# Patient Record
Sex: MALE | Race: WHITE | NOT HISPANIC OR LATINO | Employment: OTHER | URBAN - METROPOLITAN AREA
[De-identification: names, ages, dates, MRNs, and addresses within clinical notes are randomized per-mention and may not be internally consistent; named-entity substitution may affect disease eponyms.]

---

## 2022-07-13 ENCOUNTER — APPOINTMENT (OUTPATIENT)
Dept: NEUROLOGY | Facility: HOSPITAL | Age: 72
End: 2022-07-13

## 2022-07-13 ENCOUNTER — HOSPITAL ENCOUNTER (INPATIENT)
Facility: HOSPITAL | Age: 72
LOS: 1 days | Discharge: HOME | End: 2022-07-14
Attending: INTERNAL MEDICINE | Admitting: INTERNAL MEDICINE

## 2022-07-13 ENCOUNTER — APPOINTMENT (OUTPATIENT)
Dept: MRI IMAGING | Facility: HOSPITAL | Age: 72
End: 2022-07-13

## 2022-07-13 ENCOUNTER — APPOINTMENT (OUTPATIENT)
Dept: CARDIOLOGY | Facility: HOSPITAL | Age: 72
End: 2022-07-13

## 2022-07-13 ENCOUNTER — APPOINTMENT (OUTPATIENT)
Dept: CT IMAGING | Facility: HOSPITAL | Age: 72
End: 2022-07-13

## 2022-07-13 DIAGNOSIS — R13.10 DYSPHAGIA, UNSPECIFIED TYPE: ICD-10-CM

## 2022-07-13 DIAGNOSIS — R41.841 COGNITIVE COMMUNICATION DEFICIT: Primary | ICD-10-CM

## 2022-07-13 PROBLEM — I48.91 A-FIB: Status: ACTIVE | Noted: 2022-07-13

## 2022-07-13 PROBLEM — I63.9 STROKE: Status: ACTIVE | Noted: 2022-07-13

## 2022-07-13 PROBLEM — R56.9 SEIZURE: Status: ACTIVE | Noted: 2022-07-13

## 2022-07-13 LAB
B PARAPERT DNA SPEC QL NAA+PROBE: NOT DETECTED
B PERT DNA SPEC QL NAA+PROBE: NOT DETECTED
BH CV ECHO MEAS - AO MAX PG: 45 MMHG
BH CV ECHO MEAS - AO MEAN PG: 27 MMHG
BH CV ECHO MEAS - AO ROOT DIAM: 3.6 CM
BH CV ECHO MEAS - AO V2 MAX: 335 CM/SEC
BH CV ECHO MEAS - AO V2 VTI: 56.6 CM
BH CV ECHO MEAS - AVA(I,D): 1.3 CM2
BH CV ECHO MEAS - EDV(CUBED): 91.1 ML
BH CV ECHO MEAS - EDV(MOD-SP2): 126 ML
BH CV ECHO MEAS - EDV(MOD-SP4): 135 ML
BH CV ECHO MEAS - EF(MOD-BP): 61.2 %
BH CV ECHO MEAS - EF(MOD-SP2): 55.1 %
BH CV ECHO MEAS - EF(MOD-SP4): 63.3 %
BH CV ECHO MEAS - ESV(CUBED): 32.8 ML
BH CV ECHO MEAS - ESV(MOD-SP2): 56.6 ML
BH CV ECHO MEAS - ESV(MOD-SP4): 49.5 ML
BH CV ECHO MEAS - FS: 28.9 %
BH CV ECHO MEAS - IVS/LVPW: 1 CM
BH CV ECHO MEAS - IVSD: 1.1 CM
BH CV ECHO MEAS - LA DIMENSION: 4.9 CM
BH CV ECHO MEAS - LAT PEAK E' VEL: 7.7 CM/SEC
BH CV ECHO MEAS - LV MASS(C)D: 175 GRAMS
BH CV ECHO MEAS - LV MAX PG: 4.7 MMHG
BH CV ECHO MEAS - LV MEAN PG: 3 MMHG
BH CV ECHO MEAS - LV V1 MAX: 108 CM/SEC
BH CV ECHO MEAS - LV V1 VTI: 23.5 CM
BH CV ECHO MEAS - LVIDD: 4.5 CM
BH CV ECHO MEAS - LVIDS: 3.2 CM
BH CV ECHO MEAS - LVOT AREA: 3.1 CM2
BH CV ECHO MEAS - LVOT DIAM: 2 CM
BH CV ECHO MEAS - LVPWD: 1.1 CM
BH CV ECHO MEAS - MED PEAK E' VEL: 8.4 CM/SEC
BH CV ECHO MEAS - MV A MAX VEL: 99.7 CM/SEC
BH CV ECHO MEAS - MV DEC TIME: 0.27 MSEC
BH CV ECHO MEAS - MV E MAX VEL: 95.4 CM/SEC
BH CV ECHO MEAS - MV E/A: 0.96
BH CV ECHO MEAS - PA ACC TIME: 0.15 SEC
BH CV ECHO MEAS - PA PR(ACCEL): 12.4 MMHG
BH CV ECHO MEAS - PA V2 MAX: 119 CM/SEC
BH CV ECHO MEAS - RAP SYSTOLE: 8 MMHG
BH CV ECHO MEAS - RVSP: 26 MMHG
BH CV ECHO MEAS - SV(LVOT): 73.8 ML
BH CV ECHO MEAS - SV(MOD-SP2): 69.4 ML
BH CV ECHO MEAS - SV(MOD-SP4): 85.5 ML
BH CV ECHO MEAS - TAPSE (>1.6): 2.03 CM
BH CV ECHO MEAS - TR MAX PG: 17.7 MMHG
BH CV ECHO MEAS - TR MAX VEL: 206.5 CM/SEC
BH CV ECHO MEASUREMENTS AVERAGE E/E' RATIO: 11.85
BH CV VAS BP LEFT ARM: NORMAL MMHG
BH CV XLRA - RV BASE: 4.1 CM
BH CV XLRA - RV LENGTH: 5.8 CM
BH CV XLRA - RV MID: 2.9 CM
BH CV XLRA - TDI S': 6 CM/SEC
C PNEUM DNA NPH QL NAA+NON-PROBE: NOT DETECTED
CHOLEST SERPL-MCNC: 165 MG/DL (ref 0–200)
FLUAV SUBTYP SPEC NAA+PROBE: NOT DETECTED
FLUBV RNA ISLT QL NAA+PROBE: NOT DETECTED
GLUCOSE BLDC GLUCOMTR-MCNC: 111 MG/DL (ref 70–130)
HADV DNA SPEC NAA+PROBE: NOT DETECTED
HBA1C MFR BLD: 4.8 % (ref 4.8–5.6)
HCOV 229E RNA SPEC QL NAA+PROBE: NOT DETECTED
HCOV HKU1 RNA SPEC QL NAA+PROBE: NOT DETECTED
HCOV NL63 RNA SPEC QL NAA+PROBE: NOT DETECTED
HCOV OC43 RNA SPEC QL NAA+PROBE: NOT DETECTED
HDLC SERPL-MCNC: 54 MG/DL (ref 40–60)
HMPV RNA NPH QL NAA+NON-PROBE: NOT DETECTED
HPIV1 RNA ISLT QL NAA+PROBE: NOT DETECTED
HPIV2 RNA SPEC QL NAA+PROBE: NOT DETECTED
HPIV3 RNA NPH QL NAA+PROBE: NOT DETECTED
HPIV4 P GENE NPH QL NAA+PROBE: NOT DETECTED
LDLC SERPL CALC-MCNC: 98 MG/DL (ref 0–100)
LDLC/HDLC SERPL: 1.81 {RATIO}
LEFT ATRIUM VOLUME INDEX: 35.3 ML/M2
M PNEUMO IGG SER IA-ACNC: NOT DETECTED
MAXIMAL PREDICTED HEART RATE: 149 BPM
RHINOVIRUS RNA SPEC NAA+PROBE: NOT DETECTED
RSV RNA NPH QL NAA+NON-PROBE: NOT DETECTED
SARS-COV-2 RNA NPH QL NAA+NON-PROBE: NOT DETECTED
STRESS TARGET HR: 127 BPM
TRIGL SERPL-MCNC: 65 MG/DL (ref 0–150)
VLDLC SERPL-MCNC: 13 MG/DL (ref 5–40)

## 2022-07-13 PROCEDURE — 99223 1ST HOSP IP/OBS HIGH 75: CPT | Performed by: INTERNAL MEDICINE

## 2022-07-13 PROCEDURE — 92523 SPEECH SOUND LANG COMPREHEN: CPT

## 2022-07-13 PROCEDURE — 82962 GLUCOSE BLOOD TEST: CPT

## 2022-07-13 PROCEDURE — 83036 HEMOGLOBIN GLYCOSYLATED A1C: CPT

## 2022-07-13 PROCEDURE — 92610 EVALUATE SWALLOWING FUNCTION: CPT

## 2022-07-13 PROCEDURE — 70551 MRI BRAIN STEM W/O DYE: CPT

## 2022-07-13 PROCEDURE — 94660 CPAP INITIATION&MGMT: CPT

## 2022-07-13 PROCEDURE — 70450 CT HEAD/BRAIN W/O DYE: CPT

## 2022-07-13 PROCEDURE — 0 LEVETIRACETAM IN NACL 0.54% 1500 MG/100ML SOLUTION: Performed by: NURSE PRACTITIONER

## 2022-07-13 PROCEDURE — 95711 VEEG 2-12 HR UNMONITORED: CPT

## 2022-07-13 PROCEDURE — 0042T HC CT CEREBRAL PERFUSION W/WO CONTRAST: CPT

## 2022-07-13 PROCEDURE — 70498 CT ANGIOGRAPHY NECK: CPT

## 2022-07-13 PROCEDURE — 80061 LIPID PANEL: CPT

## 2022-07-13 PROCEDURE — 99223 1ST HOSP IP/OBS HIGH 75: CPT | Performed by: STUDENT IN AN ORGANIZED HEALTH CARE EDUCATION/TRAINING PROGRAM

## 2022-07-13 PROCEDURE — 93306 TTE W/DOPPLER COMPLETE: CPT

## 2022-07-13 PROCEDURE — 0202U NFCT DS 22 TRGT SARS-COV-2: CPT | Performed by: INTERNAL MEDICINE

## 2022-07-13 PROCEDURE — 0 IOPAMIDOL PER 1 ML: Performed by: INTERNAL MEDICINE

## 2022-07-13 PROCEDURE — 93306 TTE W/DOPPLER COMPLETE: CPT | Performed by: INTERNAL MEDICINE

## 2022-07-13 PROCEDURE — 70496 CT ANGIOGRAPHY HEAD: CPT

## 2022-07-13 PROCEDURE — 3E03317 INTRODUCTION OF OTHER THROMBOLYTIC INTO PERIPHERAL VEIN, PERCUTANEOUS APPROACH: ICD-10-PCS | Performed by: INTERNAL MEDICINE

## 2022-07-13 PROCEDURE — 94799 UNLISTED PULMONARY SVC/PX: CPT

## 2022-07-13 RX ORDER — MELOXICAM 15 MG/1
1 TABLET ORAL DAILY
COMMUNITY
End: 2022-10-18

## 2022-07-13 RX ORDER — ACETAMINOPHEN 325 MG/1
650 TABLET ORAL EVERY 4 HOURS PRN
Status: DISCONTINUED | OUTPATIENT
Start: 2022-07-13 | End: 2022-07-14 | Stop reason: HOSPADM

## 2022-07-13 RX ORDER — ACETAMINOPHEN 650 MG/1
650 SUPPOSITORY RECTAL EVERY 4 HOURS PRN
Status: DISCONTINUED | OUTPATIENT
Start: 2022-07-13 | End: 2022-07-14 | Stop reason: HOSPADM

## 2022-07-13 RX ORDER — SODIUM CHLORIDE 0.9 % (FLUSH) 0.9 %
10 SYRINGE (ML) INJECTION AS NEEDED
Status: DISCONTINUED | OUTPATIENT
Start: 2022-07-13 | End: 2022-07-14 | Stop reason: HOSPADM

## 2022-07-13 RX ORDER — ASPIRIN 300 MG/1
300 SUPPOSITORY RECTAL DAILY
Status: DISCONTINUED | OUTPATIENT
Start: 2022-07-14 | End: 2022-07-14 | Stop reason: HOSPADM

## 2022-07-13 RX ORDER — SODIUM CHLORIDE 0.9 % (FLUSH) 0.9 %
10 SYRINGE (ML) INJECTION EVERY 12 HOURS SCHEDULED
Status: DISCONTINUED | OUTPATIENT
Start: 2022-07-13 | End: 2022-07-13

## 2022-07-13 RX ORDER — SODIUM CHLORIDE 0.9 % (FLUSH) 0.9 %
10 SYRINGE (ML) INJECTION AS NEEDED
Status: DISCONTINUED | OUTPATIENT
Start: 2022-07-13 | End: 2022-07-13

## 2022-07-13 RX ORDER — FAMOTIDINE 20 MG/1
20 TABLET, FILM COATED ORAL
Status: DISCONTINUED | OUTPATIENT
Start: 2022-07-13 | End: 2022-07-14 | Stop reason: HOSPADM

## 2022-07-13 RX ORDER — ASPIRIN 325 MG
325 TABLET ORAL DAILY
Status: DISCONTINUED | OUTPATIENT
Start: 2022-07-14 | End: 2022-07-14 | Stop reason: HOSPADM

## 2022-07-13 RX ORDER — ATORVASTATIN CALCIUM 40 MG/1
80 TABLET, FILM COATED ORAL NIGHTLY
Status: DISCONTINUED | OUTPATIENT
Start: 2022-07-13 | End: 2022-07-14 | Stop reason: HOSPADM

## 2022-07-13 RX ORDER — FAMOTIDINE 10 MG/ML
20 INJECTION, SOLUTION INTRAVENOUS EVERY 12 HOURS SCHEDULED
Status: DISCONTINUED | OUTPATIENT
Start: 2022-07-13 | End: 2022-07-13

## 2022-07-13 RX ORDER — ONDANSETRON 2 MG/ML
4 INJECTION INTRAMUSCULAR; INTRAVENOUS EVERY 6 HOURS PRN
Status: DISCONTINUED | OUTPATIENT
Start: 2022-07-13 | End: 2022-07-14 | Stop reason: HOSPADM

## 2022-07-13 RX ORDER — LEVETIRACETAM 15 MG/ML
1500 INJECTION INTRAVASCULAR ONCE
Status: COMPLETED | OUTPATIENT
Start: 2022-07-13 | End: 2022-07-13

## 2022-07-13 RX ORDER — LEVETIRACETAM 5 MG/ML
500 INJECTION INTRAVASCULAR EVERY 12 HOURS SCHEDULED
Status: DISCONTINUED | OUTPATIENT
Start: 2022-07-13 | End: 2022-07-14 | Stop reason: HOSPADM

## 2022-07-13 RX ORDER — NAPROXEN SODIUM 220 MG/1
1 TABLET ORAL DAILY
COMMUNITY

## 2022-07-13 RX ORDER — SODIUM CHLORIDE 0.9 % (FLUSH) 0.9 %
10 SYRINGE (ML) INJECTION EVERY 12 HOURS SCHEDULED
Status: DISCONTINUED | OUTPATIENT
Start: 2022-07-13 | End: 2022-07-14 | Stop reason: HOSPADM

## 2022-07-13 RX ADMIN — FAMOTIDINE 20 MG: 10 INJECTION, SOLUTION INTRAVENOUS at 08:49

## 2022-07-13 RX ADMIN — ATORVASTATIN CALCIUM 80 MG: 40 TABLET, FILM COATED ORAL at 20:45

## 2022-07-13 RX ADMIN — IOPAMIDOL 115 ML: 755 INJECTION, SOLUTION INTRAVENOUS at 05:20

## 2022-07-13 RX ADMIN — FAMOTIDINE 20 MG: 20 TABLET ORAL at 18:33

## 2022-07-13 RX ADMIN — Medication 10 ML: at 20:46

## 2022-07-13 RX ADMIN — Medication 10 ML: at 08:49

## 2022-07-13 RX ADMIN — LEVETIRACETAM INJECTION 1500 MG: 15 INJECTION INTRAVENOUS at 10:39

## 2022-07-13 NOTE — CASE MANAGEMENT/SOCIAL WORK
Discharge Planning Assessment  [facility]     Patient Name: Priyank Small  MRN: [id number]  Today's Date: 7/13/2022    Admit Date: 7/13/2022     Discharge Needs Assessment     Row Name 07/13/22 1119       Living Environment    People in Home spouse    Name(s) of People in Home Wife Pilar Small    Current Living Arrangements home    Primary Care Provided by self    Provides Primary Care For no one    Family Caregiver if Needed spouse    Quality of Family Relationships helpful;involved;supportive    Able to Return to Prior Arrangements yes       Resource/Environmental Concerns    Resource/Environmental Concerns none       Transition Planning    Patient/Family Anticipates Transition to home with family    Patient/Family Anticipated Services at Transition     Transportation Anticipated family or friend will provide       Discharge Needs Assessment    Readmission Within the Last 30 Days no previous admission in last 30 days    Equipment Currently Used at Home cpap    Concerns to be Addressed discharge planning               Discharge Plan     Row Name 07/13/22 1138       Plan    Plan Ongoing    Plan Comments     I met with Mr. Small and his wife Pilar today at the bedside to intiate discharge planning assessment. Mr. Small lives in [facility] with his wife Pilar.     Prior to admission he was independent with adl's. He uses a cpap at night and is not current with any therapy.     Case management will continue to follow Mr. Small's progress and provide for him a safe discharge plan.        Final Discharge Disposition Code 30 - still a patient              Continued Care and Services - Admitted Since 7/13/2022    Coordination has not been started for this encounter.          Demographic Summary     Row Name 07/13/22 1118       General Information    General Information Comments Primary provider is Ailyn Light MD.   Payor source is [employer].   Fully vaccinated for covid with 1 booster.   Living  will is not on file.               Functional Status     Row Name 07/13/22 1119       Functional Status, IADL    Medications independent    Meal Preparation independent    Housekeeping independent    Laundry independent    Shopping independent       Mental Status    General Appearance WDL WDL       Mental Status Summary    Recent Changes in Mental Status/Cognitive Functioning no changes       Employment/    Employment Status retired           Anju Ledesma RN

## 2022-07-13 NOTE — PLAN OF CARE
Goal Outcome Evaluation:  Plan of Care Reviewed With: spouse, patient            SLP evaluation completed. Will continue to address dysphagia and cognitive-communication. Please see note for further details and recommendations.

## 2022-07-13 NOTE — CONSULTS
Stroke Consult Note    Patient Name: Priyank Small   MRN: [id number]  Age: 71 y.o.  Sex: male  : 1950    Primary Care Physician: Ailyn Light MD  Referring Physician:  Ary Jaramillo MD    TIME STROKE TEAM CALLED: 0440 EST     TIME PATIENT SEEN: 440 EST    Handedness: Unknown  Race:     Chief Complaint/Reason for Consultation: Aphasia, dysarthria, and confusion    HPI: Priyank Small is a 71 year old male with known medical history of hypertension and new onset atrial fibrillation with RVR who presented to [facility] after waking up shaking and confused around 0000 on 2022, appeared post ictal to the EMS crew. OSH reported that CT Head negative for acute intracranial abnormality ot hemorrhage. Per OSH report he was back to a normal neurologic baseline with no deficits after arrival to [place] and remained that way until 0300 when he had an acute onset of expressive aphasia. He was given an NIHSS of 5 for confusion, dysarthria, and aphasia. He was going to be admitted to [facility] for seizure workup until he had the acute onset of aphasia and dysarthria. He was considered to be a candidate for IV TPA d/t no contraindications and a return to baseline upon admission to the hospital with an abrupt neurologic change at 0300. He did not have any seizure activity noted at time of neurologic change while at [place]. Per OSH the risks and benefits of TPA were discussed with patient and spouse who consented, TPA administered at 0400 on 2022. OSH reported that BP was 121/91. Lorazepam and Levetiracetam ordered at OSH but not given.     Upon arrival to [facility] patient is met emergently in CT scan, NIHSS 2 for right leg drift and right facial droop. Aphasia has resolved. Patient is able to lift his right leg but reports that it feels much heavier than his left leg.     CT head repeated upon arrival d/t new deficit of right leg weakness and right facial droop negative for acute  intracranial abnormality, no hemorrhage.     Last Known Normal Date/Time: 0300 7/13/2022 EST     Review of Systems   Constitutional: Negative for fatigue and fever.   HENT: Negative for trouble swallowing.    Eyes: Negative for photophobia and visual disturbance.   Respiratory: Negative for cough and shortness of breath.    Cardiovascular: Negative for palpitations.        Denies palpitations, did have new onset atrial fibrillation with RVR at OSH    Gastrointestinal: Negative for nausea and vomiting.        Diarrhea 1 week ago   Genitourinary: Positive for frequency and urgency.   Neurological: Positive for facial asymmetry, speech difficulty and weakness. Negative for dizziness, numbness and headaches.      No past medical history on file.  No past surgical history on file.  No family history on file.     Social History     Socioeconomic History   • Marital status:      No Known Allergies  Prior to Admission medications    Not on File     States that he takes a medication for BP, he is unsure of the name.        Neurological Exam  Mental Status  Awake, alert and oriented to person, place and time.Alert. Speech is normal. Language is fluent with no aphasia.    Cranial Nerves  CN II: Visual fields full to confrontation.  CN III, IV, VI: Extraocular movements intact bilaterally. Normal lids and orbits bilaterally. Pupils equal round and reactive to light bilaterally.  CN V: Facial sensation is normal.  CN VII:  Right: There is central facial weakness.  CN VIII: Hearing appears intact.  CN IX, X: Palate elevates symmetrically  CN XI: Shoulder shrug strength is normal.  CN XII: Tongue midline without atrophy or fasciculations.    Motor  Normal muscle bulk throughout. No fasciculations present.  RUE 5/5  LUE 5/5  RLE 3/5  LLE 5/5.    Sensory  Light touch is normal in upper and lower extremities.     Reflexes                                            Right                      Left  Plantar                            Downgoing                Downgoing    Coordination    Finger-to-nose, rapid alternating movements and heel-to-shin normal bilaterally without dysmetria.    Gait    Not observed.      Physical Exam  Constitutional:       General: He is not in acute distress.     Appearance: Normal appearance.   HENT:      Head: Normocephalic and atraumatic.      Mouth/Throat:      Mouth: Mucous membranes are moist.      Pharynx: Oropharynx is clear.   Eyes:      General: Lids are normal.      Extraocular Movements: Extraocular movements intact.      Pupils: Pupils are equal, round, and reactive to light.   Cardiovascular:      Rate and Rhythm: Normal rate. Rhythm irregular.   Pulmonary:      Effort: Pulmonary effort is normal. No respiratory distress.      Comments: Room air  Musculoskeletal:      Right lower leg: No edema.      Left lower leg: No edema.   Skin:     General: Skin is warm and dry.   Neurological:      Mental Status: He is alert.      Cranial Nerves: Cranial nerve deficit present.      Motor: Weakness present.      Coordination: Coordination is intact.   Psychiatric:         Mood and Affect: Mood normal.         Speech: Speech normal.         Behavior: Behavior normal.         Thought Content: Thought content normal.         Acute Stroke Data    Thrombolytic Inclusion / Exclusion Criteria    Time: 05:02 EDT  Person Administering Scale: VONDA Gambino    Inclusion Criteria  [x]   18 years of age or greater   [x]   Onset of symptoms < 4.5 hours before beginning treatment (stroke onset = time patient was last seen well or without symptoms).   [x]   Diagnosis of acute ischemic stroke causing measurable disabling deficit (Complete Hemianopia, Any Aphasia, Visual or Sensory Extinction, Any weakness limiting sustained effort against gravity)   []   Any remaining deficit considered potentially disabling in view of patient and practitioner   Exclusion criteria (Do not proceed with Alteplase if any are checked under  exclusion criteria)  []   Onset unknown or GREATER than 4.5 hours   []   ICH on CT/MRI   []   CT demonstrates hypodensity representing acute or subacute infarct   []   Significant head trauma or prior stroke in the previous 3 months   []   Symptoms suggestive of subarachnoid hemorrhage   []   History of un-ruptured intracranial aneurysm GREATER than 10 mm   []   Recent intracranial or intraspinal surgery within the last 3 months   []   Arterial puncture at a non-compressible site in the previous 7 days   []   Active internal bleeding   []   Acute bleeding tendency   []   Platelet count LESS than 100,000 for known hematological diseases such as leukemia, thrombocytopenia or chronic cirrhosis   []   Current use of anticoagulant with INR GREATER than 1.7 or PT GREATER than 15 seconds, aPTT GREATER than 40 seconds   []   Heparin received within 48 hours, resulting in abnormally elevated aPTT GREATER than upper limit of normal   []   Current use of direct thrombin inhibitors or direct factor Xa inhibitors in the past 48 hours   []   Elevated blood pressure refractory to treatment (systolic GREATER than 185 mm/Hg or diastolic  GREATER than 110 mm/Hg   []   Suspected infective endocarditis and aortic arch dissection   []   Current use of therapeutic treatment dose of low-molecular-weight heparin (LMWH) within the previous 24 hours   []   Structural GI malignancy or bleed   Relative exclusion for all patients  []   Only minor non-disabling symptoms   []   Pregnancy   []   Seizure at onset with postictal residual neurological impairments   []   Major surgery or previous trauma within past 14 days   []   History of previous spontaneous ICH, intracranial neoplasm, or AV malformation   []   Postpartum (within previous 14 days)   []   Recent GI or urinary tract hemorrhage (within previous 21 days)   []   Recent acute MI (within previous 3 months)   []   History of un-ruptured intracranial aneurysm LESS than 10 mm   []   History  of ruptured intracranial aneurysm   []   Blood glucose LESS than 50 mg/dL (2.7 mmol/L)   []   Dural puncture within the last 7 days   []   Known GREATER than 10 cerebral microbleeds   Additional exclusions for patients with symptoms onset between 3 and 4.5 hours.  []   Age > 80.   []   On any anticoagulants regardless of INR  >>> Warfarin (Coumadin), Heparin, Enoxaparin (Lovenox), fondaparinux (Arixtra), bivalirudin (Angiomax), Argatroban, dabigatran (Pradaxa), rivaroxaban (Xarelto), or apixaban (Eliquis)   []   Severe stroke (NIHSS > 25).   []   History of BOTH diabetes and previous ischemic stroke.   []   The risks and benefits have been discussed with the patient or family related to the administration of IV thrombolytic therapy for stroke symptoms.   []   I have discussed and reviewed the patient's case and imaging with the attending prior to IV thrombolytic therapy.   0400 Time IV thrombolytic administered by [facility] Meds:  Scheduled-   Infusions- No current facility-administered medications for this encounter.     PRNs-     Functional Status Prior to Current Stroke/[place] Score: 0    NIH Stroke Scale  Time: 05:02 EDT  Person Administering Scale: VONDA Gambino    Interval: baseline  1a. Level of Consciousness: 0-->Alert, keenly responsive  1b. LOC Questions: 0-->Answers both questions correctly  1c. LOC Commands: 0-->Performs both tasks correctly  2. Best Gaze: 0-->Normal  3. Visual: 0-->No visual loss  4. Facial Palsy: 1-->Minor paralysis (flattened nasolabial fold, asymmetry on smiling)  5a. Motor Arm, Left: 0-->No drift, limb holds 90 (or 45) degrees for full 10 secs  5b. Motor Arm, Right: 0-->No drift, limb holds 90 (or 45) degrees for full 10 secs  6a. Motor Leg, Left: 0-->No drift, leg holds 30 degree position for full 5 secs  6b. Motor Leg, Right: 1-->Drift, leg falls by the end of the 5-sec period but does not hit bed  7. Limb Ataxia: 0-->Absent  8. Sensory:  0-->Normal, no sensory loss  9. Best Language: 0-->No aphasia, normal  10. Dysarthria: 0-->Normal  11. Extinction and Inattention (formerly Neglect): 0-->No abnormality    Total (NIH Stroke Scale): 2    Results Reviewed:  I have personally reviewed current lab, radiology, and data and agree with results.     OSH Labs:  ETOH <3  WBC 10.5  Hemoglobin 13.7  Hematocrit 40.4  Platelet 193  Sodium 140  Glucose 102  Creatinine 1.0  AST 20  ALT 32  CK 86  Lactic Acid 6.0  UA: ketone 5, Protein 30  UDS negative    CT Head OSH report: Diffuse age related volume loss, no mass, mass effect, or midline shift. No abnormal extra-axial fluid collection or intracranial hemorrhage. There are minor scattered deep white matter changes. No CT evidence for acute infarction. Impression: No CT evidence for acute intracranial abnormality    CT Angiogram Neck    Result Date: 7/13/2022  CTA: At most mild arterial stenoses without large vessel occlusion. Perfusion attempted but could not be successfully performed at the current time due to poor intravenous access. COMMENT: If clinically indicated, and no contraindication, head MRI is offered for further evaluation. CTA result communicated to JAIRON BAKER at 7/13/2022 3:54 AM Mountain Time. Electronically signed by:  Porter Patterson  7/13/2022 4:05 AM Mountain Time    CT Head Without Contrast Stroke Protocol    Result Date: 7/13/2022  No acute large territorial infarct, acute intracranial hemorrhage, or intracranial mass identified. Mild left mastoid effusion. COMMENT: If clinically indicated, and no contraindication, head MRI is offered for further evaluation. Noncontrast head CT result communicated to JAIRON BAKER at 7/13/2022 3:11 AM Mountain Time. Electronically signed by:  Porter Patterson  7/13/2022 3:13 AM Mountain Time    CT Angiogram Head w AI Analysis of LVO    Result Date: 7/13/2022  CTA: At most mild arterial stenoses without large vessel occlusion. Perfusion attempted but  could not be successfully performed at the current time due to poor intravenous access. COMMENT: If clinically indicated, and no contraindication, head MRI is offered for further evaluation. CTA result communicated to JAIRON BAKER at 7/13/2022 3:54 AM Mountain Time. Electronically signed by:  Porter Patterson  7/13/2022 4:05 AM Mountain Time    CT CEREBRAL PERFUSION WITH & WITHOUT CONTRAST    Result Date: 7/13/2022  No perfusion abnormality thought to reflect core infarct or tissue at risk. Electronically signed by:  Proter Patterson  7/13/2022 4:45 AM Mountain Time        Assessment/Plan:    Priyank Small is a 71 year old male with known medical history of hypertension and new onset atrial fibrillation with RVR who presented to [facility] after waking up shaking and confused around 0000 on 7/13/2022, appeared post ictal to the EMS crew. Per OSH report he was back to a normal neurologic baseline with no deficits after arrival to [place] and remained that way until 0300 when he had an acute onset of expressive aphasia. He was given an NIHSS of 5 for confusion, dysarthria, and aphasia. He was going to be admitted to [facility] for seizure workup until he had the acute onset of aphasia. He was considered to be a candidate for IV TPA d/t no contraindications and a return to baseline upon admission to the hospital with an abrupt neurologic change at 0300. He did not have any seizure activity noted at time of neurologic change while at [place]. Per OSH the risks and benefits of TPA were discussed with patient and spouse who consented, TPA administered at 0400 on 7/13/2022. OSH reported that /91. Lorazepam and Levetiracetam ordered at OSH but not given.     Upon arrival to [facility] patient is met emergently in CT scan, NIHSS 2 for right leg drift and right facial droop. Aphasia has resolved. Patient is able to lift his right leg but reports that it feels much heavier than his left leg.     CT  head repeated upon arrival d/t new deficit of right leg weakness and right facial droop negative for acute intracranial abnormality, no hemorrhage.     CTA Head and Neck did not demonstrate large vessel occlusion or flow limiting stenosis     CTP negative for core infarct or ischemic penumbra      Antiplatelet PTA: None  Anticoagulant PTA: None    Patient met criteria for TPA and was administered TPA at OSH at 0400 7/13/2022    Patient is not a candidate for emergent endovascular intervention d/t no LVO seen on CTA/CTP      1. Aphasia, dysarthria, confusion  1. Differential suspicious for ischemic stroke vs. seizure, potentially cardioembolic etiology of infarct d/t new onset atrial fibrillation  2. Initiate TIA/Ischemic stroke order set with thrombolytic therapy  3. 24 Hour CT scan at 0400 on 7/14/2022  4. Strict SBP <180, may use Cardene gtt if needed to obtain this  5. Start high intensity statin, Lipitor 80 mg  6. Lipid panel in AM  7. A1C  8. TTE with bubble study  9. MRI brain without contrast  10. Strict NPO, when cleared by SLP recommend cardiac diet  11. ASA to be started after 24H CT if no evidence of hemorrhagic conversion  12. Routine EEG today   13. Seizure precautions    Plan of care discussed with intensivist GABRIEL FERRARI, with patient, and with bedside nursing staff.    Thank you for allowing us to participate in the care of this patient, stroke neurology will continue to follow.       VONDA Gambino  July 13, 2022  05:02 EDT

## 2022-07-13 NOTE — H&P
"INTENSIVIST   INITIAL HOSPITAL VISIT NOTE     Hospital:  LOS: 0 days     Mr. Priyank Small, 71 y.o. male is seen for a Chief Complaint of:  No chief complaint on file.      Stroke (HCC)    Possible Seizure     A-fib (HCC)    HTN (hypertension)      Subjective   S     Priyank Small is a 71-year-old male with past medical history significant for hypertension and PARVIN (CPAP compliant) who presented to the ED at [facility] for a possible seizure.  He was lying in bed with his wife when she noted \"jerking movements\" and tried to awaken him but was unsuccessful. She called 911 and when EMS arrived he awoke but was confused and appeared to be post-ictal. He received a loading dose of Keppra while at the OSH. He was initially alert and oriented with no neurologic deficits but  at 0310 he became acutely confused and aphasic. Stroke work-up was initiated and initial NIH was 5. He received TNK for presumed stroke.  While in the ED he was noted to have new onset of A. fib with RVR. He states he has no prior history of seizures.     On arrival to [facility] he is alert and oriented and has no aphasia but has right upper extremity drift and right facial droop. He states that his speech is better and he feels at baseline. He has no previous history of A-Fib and no family history of stroke. He is normally quite active and plays golf several ties a week.     PMH: He  has a past medical history of HTN (hypertension) and PARVIN (obstructive sleep apnea).   PSxH: He  has a past surgical history that includes Knee Arthroplasty (Right) and Shoulder surgery (Left).      Medications:  No current facility-administered medications on file prior to encounter.     No current outpatient medications on file prior to encounter.       Allergies: He has No Known Allergies.   FH: His family history includes Heart attack in his father; Heart disease in his father; No Known Problems in his brother, mother, and sister.   SH: He  reports that he " has never smoked. He has never used smokeless tobacco. He reports current alcohol use. He reports that he does not use drugs.     The patient's relevant past medical, surgical and social history were reviewed and updated in Epic as appropriate.     ROS (14):   Review of Systems   HENT: Negative.    Eyes: Negative.    Respiratory: Negative.    Cardiovascular: Negative.    Gastrointestinal: Negative.    Endocrine: Negative.    Genitourinary: Negative.    Musculoskeletal: Negative.    Skin: Negative.    Allergic/Immunologic: Negative.    Neurological: Positive for speech difficulty and weakness.   Hematological: Negative.    Psychiatric/Behavioral: Positive for confusion.       Objective   O     Vitals    No data recorded  BP  Min: 146/71  Max: 152/83  Pulse  Min: 64  Max: 75  Resp  Min: 18  Max: 18  SpO2  Min: 99 %  Max: 99 % No data recorded     I/O 24 hrs (7:00AM - 6:59 AM)  Intake/Output     None          Medications (drips):      Physical Examination    Telemetry: Atrial fibrillation.    Constitutional:  No acute distress.  Conversant. Lying on the CT scan table.    HEENT: Normocephalic and atraumatic.   Neck:  Normal range of motion. Neck supple.   No JVD present.   No thyromegaly.    Cardiovascular: Irregularly irregular  No murmurs, rub or gallop.  No peripheral edema.   Respiratory: Normal respiratory effort.  Clear to ascultation.   Abdominal:  Soft. No masses.   Non-tender. No distension.   No hepatosplenomegaly.   MSK: Normal muscle strength and tone.   Extremities: No digital cyanosis or clubbing.   Skin: Skin is warm and dry.  No rashes, lesions or ulcers noted.    Neurological:   Alert and Oriented to person, place, and time.   Moves all extremities.   Psychiatric:  Normal affect.   Normal judgment.     Interval: baseline  1a. Level of Consciousness: 0-->Alert, keenly responsive  1b. LOC Questions: 0-->Answers both questions correctly  1c. LOC Commands: 0-->Performs both tasks correctly  2. Best Gaze:  0-->Normal  3. Visual: 0-->No visual loss  4. Facial Palsy: 1-->Minor paralysis (flattened nasolabial fold, asymmetry on smiling)  5a. Motor Arm, Left: 0-->No drift, limb holds 90 (or 45) degrees for full 10 secs  5b. Motor Arm, Right: 0-->No drift, limb holds 90 (or 45) degrees for full 10 secs  6a. Motor Leg, Left: 0-->No drift, leg holds 30 degree position for full 5 secs  6b. Motor Leg, Right: 1-->Drift, leg falls by the end of the 5-sec period but does not hit bed  7. Limb Ataxia: 0-->Absent  8. Sensory: 0-->Normal, no sensory loss  9. Best Language: 0-->No aphasia, normal  10. Dysarthria: 0-->Normal  11. Extinction and Inattention (formerly Neglect): 0-->No abnormality    Total (NIH Stroke Scale): 2               CrCl cannot be calculated (Patient's most recent lab result is older than the maximum 30 days allowed.).              Images:    Imaging Results (Last 24 Hours)     Procedure Component Value Units Date/Time    CT Angiogram Neck [[id number]] Collected: 07/13/22 0548     Updated: 07/13/22 0606    Narrative:      EXAM: CT ANGIOGRAM HEAD W AI ANALYSIS OF LVO, CT ANGIOGRAM NECK    EXAM DATE: 7/13/2022 4:59 AM    INDICATION: Neurologic deficit.     COMPARISON: 7/13/2022 .    TECHNIQUE:  Images through the head and neck with intravenous contrast in the angiographic phase. Maximum intensity projections (MIPs) and/or 3D reconstructions constructed and reviewed at time of study interpretation. NASCET criteria used for interpretation.    CT perfusion was attempted but could not be performed at the current time due to poor intravenous access.      Artificial intelligence analysis included one or more of the following options: AI analysis of LVO was utilized; This study was also analyzed by deep machine learning artificial intelligence for large vessel occlusion detection; CTA imaging was analyzed   by [employer] LVO ContaCT to enable computer-assisted triage and notification to rapidly detect a LVO and shorten time to  notification; The study utilized technology with RAPID LVO detection capability.    Low-dose CT acquisition technique included one or more of the following options: Automated exposure control; Adjustment of mA and/or KV according to patient's size; Use of iterative reconstruction.     CONTRAST:   Intravenous isovue 370 (mL):115.      FINDINGS:  TECHNICAL: Technically adequate study.    CTA:  AORTIC ARCH: No significant stenosis identified. Minimal plaque.     BRACHIOCEPHALIC, SUBCLAVIAN, AND VISUALIZED AXILLARY ARTERIES: No significant stenosis identified. Minimal plaque.    RIGHT COMMON CAROTID ARTERY (CCA): No significant stenosis identified.    RIGHT INTERNAL CAROTID ARTERY (ICA): Mild (less than 50%) stenosis of the proximal internal carotid artery near the carotid bifurcation. At most mild intracranial stenosis.    LEFT COMMON CAROTID ARTERY (CCA): No significant stenosis identified.    LEFT INTERNAL CAROTID ARTERY (ICA): Mild (less than 50%) stenosis of the proximal internal carotid artery near the carotid bifurcation. At most mild intracranial stenosis.    RIGHT VERTEBRAL ARTERY: No significant stenosis identified.    LEFT VERTEBRAL ARTERY: No significant stenosis identified.    ANTERIOR CEREBRAL ARTERIES (ACAs): No significant stenosis identified.    RIGHT MIDDLE CEREBRAL ARTERY (MCA): No significant stenosis identified.    LEFT MIDDLE CEREBRAL ARTERY (MCA): No significant stenosis identified.    RIGHT POSTERIOR CEREBRAL ARTERY (PCA): No significant stenosis identified.    LEFT POSTERIOR CEREBRAL ARTERY (PCA): No significant stenosis identified. Near fetal PCA.    BASILAR ARTERY: No significant stenosis identified.    ANEURYSM / OTHER VASCULAR: No other significant vascular abnormality identified. No evidence of venous sinus thrombosis.    BRAIN: No regions of decreased parenchymal blush to suggest acute large territorial infarct.    OTHER FINDINGS:  OTHER SIGNIFICANT FINDINGS: None.    MASTOID AIR CELLS:  Opacification of the left mastoid air cells, mild.    VISUALIZED SPINE: Chronic-appearing changes of the spine including advanced degenerative changes with high grade neural foraminal narrowing.    THYROID: Nodular thyroid gland. Largest visualized nodule measures 12 mm. Unlikely to be of clinical significance in a person of this age.        Impression:      CTA:  At most mild arterial stenoses without large vessel occlusion.     Perfusion attempted but could not be successfully performed at the current time due to poor intravenous access.      COMMENT:  If clinically indicated, and no contraindication, head MRI is offered for further evaluation.      CTA result communicated to JAIRON BAKER at 7/13/2022 3:54 AM Mountain Time.    Electronically signed by:  Porter Patterson    7/13/2022 4:05 AM Mountain Time    CT Angiogram Head w AI Analysis of LVO [[id number]] Collected: 07/13/22 0548     Updated: 07/13/22 0606    Narrative:      EXAM: CT ANGIOGRAM HEAD W AI ANALYSIS OF LVO, CT ANGIOGRAM NECK    EXAM DATE: 7/13/2022 4:59 AM    INDICATION: Neurologic deficit.     COMPARISON: 7/13/2022 .    TECHNIQUE:  Images through the head and neck with intravenous contrast in the angiographic phase. Maximum intensity projections (MIPs) and/or 3D reconstructions constructed and reviewed at time of study interpretation. NASCET criteria used for interpretation.    CT perfusion was attempted but could not be performed at the current time due to poor intravenous access.      Artificial intelligence analysis included one or more of the following options: AI analysis of LVO was utilized; This study was also analyzed by deep machine learning artificial intelligence for large vessel occlusion detection; CTA imaging was analyzed   by [employer] [facility] [employer] LVO ContaCT to enable computer-assisted triage and notification to rapidly detect a LVO and shorten time to notification; The study utilized technology with RAPID LVO detection capability.    Low-dose  CT acquisition technique included one or more of the following options: Automated exposure control; Adjustment of mA and/or KV according to patient's size; Use of iterative reconstruction.     CONTRAST:   Intravenous isovue 370 (mL):115.      FINDINGS:  TECHNICAL: Technically adequate study.    CTA:  AORTIC ARCH: No significant stenosis identified. Minimal plaque.     BRACHIOCEPHALIC, SUBCLAVIAN, AND VISUALIZED AXILLARY ARTERIES: No significant stenosis identified. Minimal plaque.    RIGHT COMMON CAROTID ARTERY (CCA): No significant stenosis identified.    RIGHT INTERNAL CAROTID ARTERY (ICA): Mild (less than 50%) stenosis of the proximal internal carotid artery near the carotid bifurcation. At most mild intracranial stenosis.    LEFT COMMON CAROTID ARTERY (CCA): No significant stenosis identified.    LEFT INTERNAL CAROTID ARTERY (ICA): Mild (less than 50%) stenosis of the proximal internal carotid artery near the carotid bifurcation. At most mild intracranial stenosis.    RIGHT VERTEBRAL ARTERY: No significant stenosis identified.    LEFT VERTEBRAL ARTERY: No significant stenosis identified.    ANTERIOR CEREBRAL ARTERIES (ACAs): No significant stenosis identified.    RIGHT MIDDLE CEREBRAL ARTERY (MCA): No significant stenosis identified.    LEFT MIDDLE CEREBRAL ARTERY (MCA): No significant stenosis identified.    RIGHT POSTERIOR CEREBRAL ARTERY (PCA): No significant stenosis identified.    LEFT POSTERIOR CEREBRAL ARTERY (PCA): No significant stenosis identified. Near fetal PCA.    BASILAR ARTERY: No significant stenosis identified.    ANEURYSM / OTHER VASCULAR: No other significant vascular abnormality identified. No evidence of venous sinus thrombosis.    BRAIN: No regions of decreased parenchymal blush to suggest acute large territorial infarct.    OTHER FINDINGS:  OTHER SIGNIFICANT FINDINGS: None.    MASTOID AIR CELLS: Opacification of the left mastoid air cells, mild.    VISUALIZED SPINE: Chronic-appearing  changes of the spine including advanced degenerative changes with high grade neural foraminal narrowing.    THYROID: Nodular thyroid gland. Largest visualized nodule measures 12 mm. Unlikely to be of clinical significance in a person of this age.        Impression:      CTA:  At most mild arterial stenoses without large vessel occlusion.     Perfusion attempted but could not be successfully performed at the current time due to poor intravenous access.      COMMENT:  If clinically indicated, and no contraindication, head MRI is offered for further evaluation.      CTA result communicated to JAIRON BAKER at 7/13/2022 3:54 AM Mountain Time.    Electronically signed by:  Porter Patterson    7/13/2022 4:05 AM Mountain Time    CT CEREBRAL PERFUSION WITH & WITHOUT CONTRAST [[id number]] Resulted: 07/13/22 0500     Updated: 07/13/22 0523    CT Head Without Contrast Stroke Protocol [[id number]] Collected: 07/13/22 0507     Updated: 07/13/22 0514    Narrative:      EXAM: CT HEAD WO CONTRAST STROKE PROTOCOL    EXAM DATE: 7/13/2022 4:50 AM    INDICATION: Seizure, right lower extremity weakness, right facial droop, abnormal speech.      COMPARISON: No relevant prior studies listed on PACS at the time of this dictation .    TECHNIQUE  Images through the head without intravenous contrast.    Low-dose CT acquisition technique included one or more of the following options: Automated exposure control; Adjustment of mA and/or KV according to patient's size; Use of iterative reconstruction.    CONTRAST:   None.    FINDINGS:  TECHNICAL: Technically adequate study.    INTRACRANIAL CONTENTS:  No acute intracranial hemorrhage.    No intracranial mass.    No acute large territorial infarct.    Ventricles, sulci, and cisterns prominent due to mild volume loss.    SOFT TISSUES: No significant abnormality.    SKULL: No acute abnormality.    PARANASAL SINUSES: At most mild opacification.    ORBITS: Within normal limits.    MASTOID AIR CELLS:  Opacification of the left mastoid air cells, mild.    INTRACRANIAL VESSELS: Calcification of the intracranial internal carotid arteries.        Impression:      No acute large territorial infarct, acute intracranial hemorrhage, or intracranial mass identified.    Mild left mastoid effusion.      COMMENT:  If clinically indicated, and no contraindication, head MRI is offered for further evaluation.      Noncontrast head CT result communicated to JAIRON BAKER at 7/13/2022 3:11 AM Mountain Time.    Electronically signed by:  Porter Patterson    7/13/2022 3:13 AM Mountain Time        ECG/EMG Results (last 24 hours)     ** No results found for the last 24 hours. **          I reviewed the patient's new laboratory and imaging results.  I independently reviewed the patient's new images.    Assessment & Plan   A / P     Mr. Small is a 72yo M with a history of HTN and PARVIN who presented to [facility] via EMS for altered mental status, confusion and possible seizure. He received a loading dose of keppra at the OSH. At 0310, he became acutely confused and aphasic. His initial NIHSS was a 5 and he was given TNK and transferred to [facility]. While in the ED, he was noted to have new onset Afib with RVR. His initial NIHSS upon arrival here is a 2.       Nutrition:   NPO Diet NPO Type: Strict NPO  Advance Directives:   Code Status and Medical Interventions:   Ordered at: 07/13/22 0611     Code Status (Patient has no pulse and is not breathing):    CPR (Attempt to Resuscitate)     Medical Interventions (Patient has pulse or is breathing):    Full Support       Active Hospital Problems    Diagnosis    • **Stroke (HCC)    • Possible Seizure     • A-fib (HCC)    • HTN (hypertension)        Assessment / Plan:    1. Admit to Neuro ICU  2. Post-TNK order set.   3. Blood pressure control with Cardene as needed.   4. Echocardiogram  5. Speech   6. PT/OT  7. May need a Cardiology consult for new-onset Afib.  8. Defer continuation of  Keppra to Neurology  9. Will need MRI Brain  10. Follow up labs    High risk secondary to CVA with receipt of TNK and will require intensive monitoring.     Plan of care and goals reviewed during interdisciplinary rounds.  I discussed the patient's findings and my recommendations with patient and nursing staff      Ary Jaramillo, DO  Pulmonary and Critical Care Medicine

## 2022-07-13 NOTE — NURSING NOTE
EEG tech Marleen) at bedside to perform skin check.  Reapplied paste and secured head wrap with tape.  No signs of redness, swelling or irritation.  Chin strap secure and not too tight.  Patient denies any pain or discomfort from head wrap or chin strap.   RN called while securing head wrap and notified me that per Neurology, ERIC could be discontinued.  Head wrap and electrodes removed.  RN at bedside. Cleaned head with warm washcloth.  No signs of redness, swelling or irritation.

## 2022-07-13 NOTE — PROGRESS NOTES
"Clinical Nutrition Note      Patient Name: Priyank Small  MRN: [id number]  Admission date: 7/13/2022      Multidisciplinary Rounds    Additional information obtained during MDR:  RN reports pt adm from OSH hospital w/ seizures, then stroke at OSH adm tPA, he also has new onset a fib. SLP to see, pt for f/u CT at 4 am.      Current diet: Diet Regular; Thin; Cardiac    Oral Nutrition Supplement:     Pertinent medical data reviewed:  No nutrition risk identified on nursing screen; MST score \"0\"    Average oral intake per documentation:              Intervention:  Menu provided, pt advised of alternate selections, menu adjusted  Plan of care and goals of care reviewed    Monitor:  RD to follow per protocol      Hoda Cintron MS,RD,LD  07/13/22 11:22 EDT  Time: 15  mins       "

## 2022-07-13 NOTE — NURSING NOTE
ACC REVIEW REPORT: [facility]        PATIENT NAME: Priyank Small    PATIENT ID: [id number]      COVID-19 ACC SCREENING       DOES THE PATIENT HAVE A FEVER GREATER THAN OR EQUAL .4: NO    IS THE PATIENT EXPERIENCING SHORTNESS OF BREATH: NO    DOES THE PATIENT HAVE A COUGH: NO    DOES THE PATIENT HAVE ANY OF THE FOLLOWING RISK FACTORS:    EXPOSURE TO SUSPECTED OR KNOWN COVID-19: NO    RECENT TRAVEL HISTORY TO ENDEMIC AREA (DOMESTIC/LOCAL): NO    IS THE PATIENT A HEALTHCARE WORKER: NO    HAS THE PATIENT EXPERIENCED A LOSS OF SENSE OF TASTE OR SMELL:  NO    HAS THE PATIENT BEEN TESTED FOR COVID-19: YES    DATE TESTED: 07/13/22    LAB TESTING SENT TO:       BED: 224    BED TYPE: ICU    BED GIVEN TO: GENE GARZA RN    TIME BED GIVEN: 0340    TODAY'S DATE: 7/13/2022    TRANSFER DATE: 7/13/2022    ETA: 0435    TRANSFERRING FACILITY: [facility]    TRANSFERRING FACILITY PHONE # : [phone]    TRANSFERRING MD: DR. FERRER    ACCEPTING PROVIDER: MD DR. SHEPARD    NEUROLOGY PHYSICIAN:     DATE/TIME REQUEST RECEIVED: 0319 7/13/2022    [facility] RN: LINDA MORA RN    REPORT FROM: GENE GARZA RN    TIME REPORT TAKEN: 0400    DIAGNOSIS: STROKE    REASON FOR TRANSFER TO [facility]: STROKE/HIGHER LEVEL OF CARE    TRANSPORTATION: GROUND    CLINICAL REASON FOR TRANSFER TO [facility]: STROKE/HIGHER LEVEL OF CARE      CLINICAL INFORMATION    HEIGHT: 6ft    WEIGHT: 98kg    ALLERGIES: NKDA    INFECTIOUS DISEASE: NO    ISOLATION: NO    VITAL SIGNS:   TIME: 0400  TEMP: 98.5  PULSE: 89  B/P: 141/68  RESP: 18      LAB INFORMATION: NO ABNORMAL RESULTS    CULTURE INFORMATION:     MEDS/IV FLUIDS: TPA BOLUS 8.8 MG GIVEN AND INFUSION OF 79.6MG BEGUN FOR TOTAL DOSE OF 88.4MG INFUSING AS OF 0400   20G IV IN R UPPER ARM AND 20G IN L FA AFTER SEVERAL ATTEMPTS. REQUESTED THAT 18G IV BE ATTEMPTED      CARDIAC SYSTEM:    CHEST PAIN: NONE    RATE:     SCALE:     RHYTHM:     Is patient taking or has patient been given any drugs that could increase  bleeding? TPA      DRUG: TPA     DOSE/FREQUENCY: 88.4MG ONCE    TROPONIN:    DATE:   TIME:   TROP:     DATE:   TIME:   TROP:       HEART CATH:     HEART CATH DATE:     HEART CATH RESULTS:     LAD:   RCA:   CX:   LMAIN:   EF:     SWAN:     SITE:   SIZE:    DATE INSERTED:    ARTLINE:     SITE:   SIZE:   DATE INSERTED:     SHEATH:    SITE:   SIZE:   DATE INSERTED:         VASOSEAL:    SITE:   DATE INSERTED:     EXTERNAL PACEMAKER:     RATE:   EXT PACER ON:     MODE:    DATE INSERTED:   OUTPUT SETTING:   SENSITIVITY SETTING:   SENSITIVITY TYPE:     IABP:    SITE:   AUG PRESSURE:   DATE INSERTED:     CARDIAC NOTES:       RESPIRATORY SYSTEM:    LUNG SOUNDS: CLEAR    ABG DATE:         ABG TIME:     ABG RESULTS:    PH:   PCO2:   PO2:   HCO3:   O2 SAT:     ETT SIZE:     ORAL:     NASAL:     SECURED AT MEASUREMENT (CM):     ON VENTILATOR:    TV:   FI02:   RATE:   PEEP:     OXYGEN: ROOM AIR    O2 SAT: 98      IMAGING FINDINGS:     PNEUMO CHEST TUBE SEAL TYPE:     RESPIRATORY STATUS: STABLE      CNS/MUSCULOSKELETAL      ELIZABET COMA SCALE:    E:     M:     V:       LAST KNOWN WELL: 0310          NIHSS    Survey Item  0: Means Alert  1: Drowsy or Answer Correctly  2: Incorrect, Forced, Can't Resist Gravity  3: Complete or No Effort  4: No Movement  NT: Not Testable Acceptable As Noted Above      1A: Level of Consciousness: 2    1B: LOC Questions (month, age) :     1C: LOC Commands (open/close eyes, make a fist & let go): 0    2:  Best Gaze (eyes open-pt follows examiner's fingers or face): 0    3:  Visual (introduce visual stimulus/threat to pt's visual field quad. Cover 1 eye and hold up fingers in all 4 quadrants) : 0    4.  Facial Palsy (show teeth, raise eyebrows and squeeze eyes tightly shut): 0    5A: Motor Arm-Left (elevate extremity to 90 degrees and score drift/movement.  Count to 10 aloud and use fingers for visual cue): 0    5B:  Motor Arm-Right (elevate extremity to 90 degrees and score drift/movement.  Count to 10  aloud and use fingers for visual cue): 0    6A:  Motor Leg-Left (elevate extremity to 30 degrees and score drift/movement.  Count to 5 out loud and use fingers for visual cue): 0    6B:  Motor Leg-Right (elevate extremity to 30 degrees and score drift/movement.  Count to 5 out loud and use fingers for visual cue): 0    7:  Limb Ataxia- finger to nose, heel down shin: 0    8:  Sensory- pin prick to face, arms, trunk, and legs. Compare sharpness side to side: 0    9:  Best Language- name, items, describe picture, and read sentences.  Do not forget glasses if they normally wear them: 2    10: Dysarthria- elevate speech clarity by pt reading or repeating words on a list: 0    11: Extinction and Inattention- Use information from prior testing or double simultaneous stimuli testing to identify neglect. Face, arms, legs and visual field: 1    Total NIHSS Score: 5  Date: 07/13/2022  Time of NIHSS Assessment: 0400        SIZE OF HEMORRHAGE:     CAT SCAN RESULTS: NEGATIVE    MRI RESULTS:     CNS/MUSCULOSKELETAL NOTES:       GI//GY      ABDOMINAL PAIN:     VOMITING:     DIARRHEA:    NAUSEA: NO    BOWEL SOUNDS:     OCCULT STOOL:     HEMATURIA:    NG TUBE:    SIZE:   DATE INSERTED:       ULTRASOUND RESULTS:       ACUTE ABDOMEN RESULTS:       CT SCAN RESULTS: NEGATIVE      GI//GY NOTES:     AKERS:     PAST MEDICAL HISTORY: HTN    OTHER SYMPTOM NOTES: NEW ONSET AF RVR AS REPORTED PER PATIENT'S WIFE    ADDITIONAL NOTES: CAME TO ED AFTER HAVING SEIZURE AND PRESENTED TO [facility] ED ALERT AND ORIENTED BUT AT 0310 PATIENT BECAME APHASIC AND STROKE WORK UP BEGUN          Kayla Johnson  7/13/2022  04:01 EDT

## 2022-07-13 NOTE — NURSING NOTE
Carito EEG tech at bedside with Karen RN to check skin integrity. Slight redness under electrodes, electrodes moved slightly per guidelines. No complaints from patient. Video/audio at bedside.

## 2022-07-13 NOTE — NURSING NOTE
Routine EEG ordered and was being performed at approx. 0800.  During EEG, the waveforms changed slightly.  The patient's sats dropped to the 80s.  Pt could not comprehend how to take deep breaths in and out, he began to have stuttering speech and was not oriented to place.  This lasted only a few minutes and then he went to baseline.  It was decided by the tech to change the order to ERIC and leave him hooked up.  The nurse, Karen checked the skin with me before the wrap was placed.  No redness, abrasions, or irritation noted.  The wife was educated on the event button.  At approx. 0900, the machine was changed out to the actual LTM machine.  Will await word to continue or d/c and check skin later.

## 2022-07-13 NOTE — CONSULTS
Chart review for diabetes educator consult.    At the time of this review patient A1c is 4.8 , they have no noted history of diabetes and no home medications noted for treatment of diabetes. At this time we do not feel the patient would benefit from diabetes education. Thank you for this consult, should patient needs change please re consult us.

## 2022-07-13 NOTE — THERAPY EVALUATION
Acute Care - Speech Language Pathology Initial Evaluation  [facility]   Cognitive-Communication Evaluation  Clinical Swallow Evaluation       Patient Name: Priyank Small  : 1950  MRN: [id number]  Today's Date: 2022               Admit Date: 2022     Visit Dx:    ICD-10-CM ICD-9-CM   1. Cognitive communication deficit  R41.841 799.52   2. Dysphagia, unspecified type  R13.10 787.20     Patient Active Problem List   Diagnosis   • Stroke (HCC)   • Possible Seizure    • A-fib (HCC)   • HTN (hypertension)     Past Medical History:   Diagnosis Date   • HTN (hypertension)    • PARVIN (obstructive sleep apnea)     CPAP compliance     Past Surgical History:   Procedure Laterality Date   • KNEE ARTHROPLASTY Right    • SHOULDER SURGERY Left     Fractured humerus       SLP Recommendation and Plan  SLP Diagnosis: Mild comprehension and expression deficits as well as mild cognitive deficits. (22)        Swallow Criteria for Skilled Therapeutic Interventions Met: demonstrates skilled criteria (22)  SLC Criteria for Skilled Therapy Interventions Met: yes (22)  Anticipated Discharge Disposition (SLP): unknown (22)     Therapy Frequency (Swallow): PRN (22)  Predicted Duration Therapy Intervention (Days): until discharge (22)                    Plan of Care Reviewed With: spouse, patient (22)      SLP EVALUATION (last 72 hours)     SLP SLC Evaluation     Row Name 22                   Communication Assessment/Intervention    Document Type evaluation  -VO        Subjective Information no complaints  -VO        Patient Observations alert;cooperative  -VO        Patient/Family/Caregiver Comments/Observations wife present  -VO        Patient Effort good  -VO                  General Information    Patient Profile Reviewed yes  -VO        Pertinent History Of Current Problem r/o CVA vs sz. CTH negative, no MRI completed right now. TPA  "admin @ OSH. Has had a couple episodes once transferred to [facility] where \"loses speech, begins stuttering, and sats drop\".  -VO        Precautions/Limitations, Vision WFL;for purposes of eval  -VO        Precautions/Limitations, Hearing WFL;for purposes of eval  -VO        Prior Level of Function-Communication WFL  -VO        Plans/Goals Discussed with patient;family;agreed upon  -VO        Barriers to Rehab none identified  -VO        Patient's Goals for Discharge return to home;return to all previous roles/activities  -VO                  Comprehension Assessment/Intervention    Comprehension Assessment/Intervention Auditory Comprehension  -VO                  Auditory Comprehension Assessment/Intervention    Auditory Comprehension (Communication) mild impairment  -VO        Able to Identify Objects/Pictures (Communication) WFL  -VO        Answers Questions (Communication) mulit-unit;abstract;mild impairment  -VO        Able to Follow Commands (Communication) WFL  -VO        Narrative Discourse conversational level;mild impairment  -VO                  Expression Assessment/Intervention    Expression Assessment/Intervention verbal expression  -VO                  Verbal Expression Assessment/Intervention    Verbal Expression mild impairment  -VO        Automatic Speech (Communication) WFL  -VO        Repetition WFL  -VO        Phrase Completion WFL  -VO        Responsive Naming WFL  -VO        Confrontational Naming WFL  -VO        Spontaneous/Functional Words WFL  -VO        Sentence Formulation mild impairment  -VO        Conversational Discourse/Fluency mild impairment;delayed responses;perseverations  -VO        Verbal Expression, Comment Inconsistent moments when pt would attempt to find word in conversation or repeat a word several times (perseveration vs neurogenic stuttering)  -VO                  Oral Motor Structure and Function    Oral Motor Structure and Function WFL  -VO        Dentition Assessment " natural, present and adequate  -VO        Mucosal Quality moist, healthy  -VO                  Oral Musculature and Cranial Nerve Assessment    Oral Motor General Assessment oral labial or buccal impairment  -VO        Oral Labial or Buccal Impairment, Detail, Cranial Nerve VII (Facial): right labial droop  minimal  -VO                  Motor Speech Assessment/Intervention    Motor Speech Function WFL  -VO                  Cognitive Assessment Intervention- SLP    Cognitive Function (Cognition) mild impairment  -VO        Orientation Status (Cognition) awareness of basic personal information;person;place;time;situation;WFL  has fluctuated though w/ episodes per RN  -VO        Memory (Cognitive) WFL  -VO        Attention (Cognitive) mild impairment  -VO        Thought Organization (Cognitive) mental manipulation;high level;mild impairment  -VO        Reasoning (Cognitive) mental flexibility;mild impairment  -VO        Problem Solving (Cognitive) WFL  -VO        Executive Function (Cognition) deficit awareness;mild impairment  -VO        Pragmatics (Communication) WFL  -VO        Right Hemisphere Function WFL  -VO                  SLP Evaluation Clinical Impressions    SLP Diagnosis Mild comprehension and expression deficits as well as mild cognitive deficits.  -VO        Rehab Potential/Prognosis good  -VO        SLC Criteria for Skilled Therapy Interventions Met yes  -VO        Functional Impact functional impact in social situations;functional impact in ADLs;difficulty in expressing complex messages  -VO                  Recommendations    Therapy Frequency (SLP SLC) 5 days per week  -VO        Predicted Duration Therapy Intervention (Days) until discharge  -VO        Anticipated Discharge Disposition (SLP) unknown;anticipate therapy at next level of care  -VO              User Key  (r) = Recorded By, (t) = Taken By, (c) = Cosigned By    Initials Name Effective Dates    VO Perlita Benavidez MA,CCC-SLP 06/16/21 -                     EDUCATION  The patient has been educated in the following areas:     Cognitive Impairment Communication Impairment.           SLP GOALS     Row Name 07/13/22 0915 07/13/22 0845          (LTG) Patient will demonstrate functional swallow for    Diet Texture (Demonstrate functional swallow) regular textures  -VO --     Liquid viscosity (Demonstrate functional swallow) thin liquids  -VO --     [place] (Demonstrate functional swallow) independently (over 90% accuracy)  -VO --     Time Frame (Demonstrate functional swallow) 1 week  -VO --     Progress/Outcomes (Demonstrate functional swallow) new goal  -VO --            (STG) Patient will tolerate trials of    Consistencies Trialed (Tolerate trials) regular textures;thin liquids  -VO --     Desired Outcome (Tolerate trials) without signs/symptoms of aspiration  -VO --     [place] (Tolerate trials) independently (over 90% accuracy)  -VO --     Time Frame (Tolerate trials) 1 week  -VO --     Progress/Outcomes (Tolerate trials) new goal  -VO --            Comprehend Questions Goal 1 (SLP)    Improve Ability to Comprehend Questions Goal 1 (SLP) -- complex wh questions;complex general questions;abstract questions;80%;with minimal cues (75-90%)  -VO     Time Frame (Comprehend Questions Goal 1, SLP) -- short term goal (STG)  -VO     Progress/Outcomes (Comprehend Questions Goal 1, SLP) -- new goal  -VO            Comprehend Narrative Discourse Goal 1 (SLP)    Improve Comprehension of Narrative Discourse Goal 1 (SLP) -- respond appropriately to complex conversational statements;respond appropriately to abstract conversational statements;80%;with minimal cues (75-90%)  -VO     Time Frame (Comprehend Narrative Discourse Goal 1, SLP) -- short term goal (STG)  -VO     Progress/Outcomes (Comprehend Narrative Discourse Goal 1, SLP) -- new goal  -VO            Connected Speech to Express Thoughts Goal 1 (SLP)    Improve Narrative Discourse to Express Thoughts  By Goal 1 (SLP) -- giving multiple definitions of a word;describing a picture;conversational task, self-directed;80%;with minimal cues (75-90%)  -VO     Time Frame (Connected Speech Goal 1, SLP) -- short term goal (STG)  -VO     Progress/Outcomes (Connected Speech Goal 1, SLP) -- new goal  -VO            Attention Goal 1 (SLP)    Improve Attention by Goal 1 (SLP) -- complete sustained attention task;80%;with minimal cues (75-90%)  -VO     Time Frame (Attention Goal 1, SLP) -- short term goal (STG)  -VO     Progress/Outcomes (Attention Goal 1, SLP) -- new goal  -VO            Organizational Skills Goal 1 (SLP)    Improve Thought Organization Through Goal 1 (SLP) -- completing mental manipulation task;completing a verbal sequencing task;80%;with minimal cues (75-90%)  -VO     Time Frame (Thought Organization Skills Goal 1, SLP) -- short term goal (STG)  -VO     Progress/Outcomes (Thought Organization Skills Goal 1, SLP) -- new goal  -VO            Reasoning Goal 1 (SLP)    Improve Reasoning Through Goal 1 (SLP) -- complete mental flexibility task;complete high level reasoning task;80%;with minimal cues (75-90%)  -VO     Progress/Outcomes (Reasoning Goal 1, SLP) -- new goal  -VO            Additional Goal 1 (SLP)    Additional Goal 1, SLP -- Pt will improve cognitive communication skills in order to allow optimal participation in care and safety w/ ADLs.  -VO     Time Frame (Additional Goal 1, SLP) -- by discharge  -VO     Progress/Outcomes (Additional Goal 1, SLP) -- new goal  -VO           User Key  (r) = Recorded By, (t) = Taken By, (c) = Cosigned By    Initials Name Provider Type    VO Perlita Benavidez MA,CCC-SLP Speech and Language Pathologist                        Time Calculation:      Time Calculation- SLP     Row Name 07/13/22 0953             Time Calculation- SLP    SLP Start Time 0845  -VO      SLP Received On 07/13/22  -VO              Untimed Charges    17504-PP Eval Speech and Production w/ Language  Minutes 38  -VO      74001-UO Eval Oral Pharyng Swallow Minutes 30  -VO              Total Minutes    Untimed Charges Total Minutes 68  -VO       Total Minutes 68  -VO            User Key  (r) = Recorded By, (t) = Taken By, (c) = Cosigned By    Initials Name Provider Type    VO Perlita Benavidez MA,CCC-SLP Speech and Language Pathologist                Therapy Charges for Today     Code Description Service Date Service Provider Modifiers Qty    03133264274 HC ST EVAL ORAL PHARYNG SWALLOW 2 2022 Perlita Benavidez MA,CCC-SLP GN 1    [id number] HC ST EVAL SPEECH AND PROD W LANG  3 2022 Perlita Benavidez MA,CCC-SLP GN 1                     Perlita Benavidez MA,CCC-SLP  2022 and Acute Care - Speech Language Pathology   Swallow Initial Evaluation [facility]     Patient Name: Priyank Small  : 1950  MRN: [id number]  Today's Date: 2022               Admit Date: 2022    Visit Dx:     ICD-10-CM ICD-9-CM   1. Cognitive communication deficit  R41.841 799.52   2. Dysphagia, unspecified type  R13.10 787.20     Patient Active Problem List   Diagnosis   • Stroke (HCC)   • Possible Seizure    • A-fib (HCC)   • HTN (hypertension)     Past Medical History:   Diagnosis Date   • HTN (hypertension)    • PARVIN (obstructive sleep apnea)     CPAP compliance     Past Surgical History:   Procedure Laterality Date   • KNEE ARTHROPLASTY Right    • SHOULDER SURGERY Left     Fractured humerus       SLP Recommendation and Plan  SLP Swallowing Diagnosis: swallow WFL (22)  SLP Diet Recommendation: regular textures, thin liquids (22)  Recommended Precautions and Strategies: upright posture during/after eating, small bites of food and sips of liquid, general aspiration precautions (22)  SLP Rec. for Method of Medication Administration: meds whole, with thin liquids, as tolerated (22)     Monitor for Signs of Aspiration: yes, notify SLP if any concerns (22  "0915)     Swallow Criteria for Skilled Therapeutic Interventions Met: demonstrates skilled criteria (07/13/22 0915)  Anticipated Discharge Disposition (SLP): unknown (07/13/22 0915)  Rehab Potential/Prognosis, Swallowing: good, to achieve stated therapy goals (07/13/22 0915)  Therapy Frequency (Swallow): PRN (07/13/22 0915)  Predicted Duration Therapy Intervention (Days): until discharge (07/13/22 0915)                                  Plan of Care Reviewed With: spouse, patient      SWALLOW EVALUATION (last 72 hours)     SLP Adult Swallow Evaluation     Row Name 07/13/22 0915                   Rehab Evaluation    Document Type evaluation  -VO        Subjective Information no complaints  -VO        Patient Observations alert;cooperative  -VO        Patient/Family/Caregiver Comments/Observations wife present  -VO        Patient Effort good  -VO                  General Information    Patient Profile Reviewed yes  -VO        Pertinent History Of Current Problem r/o CVA vs sz. CTH negative, no MRI completed right now. TPA admin @ OSH. [facility] \"loses speech, begins stuttering, and sats drop\".  -VO        Current Method of Nutrition NPO  -VO        Precautions/Limitations, Vision WFL;for purposes of eval  -VO        Precautions/Limitations, Hearing WFL;for purposes of eval  -VO        Prior Level of Function-Communication WFL  -VO        Prior Level of Function-Swallowing no diet consistency restrictions  -VO        Plans/Goals Discussed with patient;family;agreed upon  -VO        Barriers to Rehab none identified  -VO        Patient's Goals for Discharge return to PO diet  -VO        Family Goals for Discharge patient able to return to PO diet  -VO                  Pain    Additional Documentation Pain Scale: Numbers Pre/Post-Treatment (Group)  -VO                  Pain Scale: Numbers Pre/Post-Treatment    Pretreatment Pain Rating 0/10 - no pain  -VO        Posttreatment " Pain Rating 0/10 - no pain  -VO                  Oral Motor Structure and Function    Dentition Assessment natural, present and adequate  -VO        Secretion Management WNL/WFL  -VO        Mucosal Quality moist, healthy  -VO        Volitional Swallow WFL  -VO        Volitional Cough WFL  -VO                  Oral Musculature and Cranial Nerve Assessment    Oral Motor General Assessment oral labial or buccal impairment  -VO        Oral Labial or Buccal Impairment, Detail, Cranial Nerve VII (Facial): right labial droop  -VO                  General Eating/Swallowing Observations    Respiratory Support Currently in Use room air  -VO        Eating/Swallowing Skills self-fed  -VO        Positioning During Eating upright in bed  -VO        Utensils Used spoon;cup;straw  -VO        Consistencies Trialed thin liquids;pureed;regular textures  -VO                  Respiratory    Respiratory Status WFL  -VO                  Clinical Swallow Eval    Oral Prep Phase WFL  -VO        Oral Residue WFL  -VO        Pharyngeal Phase no overt signs/symptoms of pharyngeal impairment  -VO        Clinical Swallow Evaluation Summary Minimal labial wkns on R side noted. Trialed thins via cup/str, puree, and solid crackers. One instance of coughing after consecutive sips of thins w/ straws when there was a mis-hap and straw slipped out of oral cavity during trial. No further when repeated or any other trials w/ thins or other consistencies. Oral phase WFL. Will f/u for diet tolerance to ensure no difficulties. Since has hx of episodes (? sz activity per RN) w/ neuro changes, RN to make aware if re-eval needed.  -VO                  SLP Evaluation Clinical Impression    SLP Swallowing Diagnosis swallow WFL  -VO        Functional Impact no impact on function  -VO        Rehab Potential/Prognosis, Swallowing good, to achieve stated therapy goals  -VO        Swallow Criteria for Skilled Therapeutic Interventions Met demonstrates skilled  criteria  -VO                  Recommendations    Therapy Frequency (Swallow) PRN  -VO        Predicted Duration Therapy Intervention (Days) until discharge  -VO        SLP Diet Recommendation regular textures;thin liquids  -VO        Recommended Precautions and Strategies upright posture during/after eating;small bites of food and sips of liquid;general aspiration precautions  -VO        Oral Care Recommendations Oral Care BID/PRN;Toothbrush  -VO        SLP Rec. for Method of Medication Administration meds whole;with thin liquids;as tolerated  -VO        Monitor for Signs of Aspiration yes;notify SLP if any concerns  -VO        Anticipated Discharge Disposition (SLP) unknown  -VO              User Key  (r) = Recorded By, (t) = Taken By, (c) = Cosigned By    Initials Name Effective Dates    VO Perlita Benavidez MA,CCC-SLP 06/16/21 -                 EDUCATION  The patient has been educated in the following areas:   Dysphagia (Swallowing Impairment) Oral Care/Hydration.        SLP GOALS     Row Name 07/13/22 0915 07/13/22 0845          (LTG) Patient will demonstrate functional swallow for    Diet Texture (Demonstrate functional swallow) regular textures  -VO --     Liquid viscosity (Demonstrate functional swallow) thin liquids  -VO --     [place] (Demonstrate functional swallow) independently (over 90% accuracy)  -VO --     Time Frame (Demonstrate functional swallow) 1 week  -VO --     Progress/Outcomes (Demonstrate functional swallow) new goal  -VO --            (STG) Patient will tolerate trials of    Consistencies Trialed (Tolerate trials) regular textures;thin liquids  -VO --     Desired Outcome (Tolerate trials) without signs/symptoms of aspiration  -VO --     [place] (Tolerate trials) independently (over 90% accuracy)  -VO --     Time Frame (Tolerate trials) 1 week  -VO --     Progress/Outcomes (Tolerate trials) new goal  -VO --            Comprehend Questions Goal 1 (SLP)    Improve Ability to  Comprehend Questions Goal 1 (SLP) -- complex wh questions;complex general questions;abstract questions;80%;with minimal cues (75-90%)  -VO     Time Frame (Comprehend Questions Goal 1, SLP) -- short term goal (STG)  -VO     Progress/Outcomes (Comprehend Questions Goal 1, SLP) -- new goal  -VO            Comprehend Narrative Discourse Goal 1 (SLP)    Improve Comprehension of Narrative Discourse Goal 1 (SLP) -- respond appropriately to complex conversational statements;respond appropriately to abstract conversational statements;80%;with minimal cues (75-90%)  -VO     Time Frame (Comprehend Narrative Discourse Goal 1, SLP) -- short term goal (STG)  -VO     Progress/Outcomes (Comprehend Narrative Discourse Goal 1, SLP) -- new goal  -VO            Connected Speech to Express Thoughts Goal 1 (SLP)    Improve Narrative Discourse to Express Thoughts By Goal 1 (SLP) -- giving multiple definitions of a word;describing a picture;conversational task, self-directed;80%;with minimal cues (75-90%)  -VO     Time Frame (Connected Speech Goal 1, SLP) -- short term goal (STG)  -VO     Progress/Outcomes (Connected Speech Goal 1, SLP) -- new goal  -VO            Attention Goal 1 (SLP)    Improve Attention by Goal 1 (SLP) -- complete sustained attention task;80%;with minimal cues (75-90%)  -VO     Time Frame (Attention Goal 1, SLP) -- short term goal (STG)  -VO     Progress/Outcomes (Attention Goal 1, SLP) -- new goal  -VO            Organizational Skills Goal 1 (SLP)    Improve Thought Organization Through Goal 1 (SLP) -- completing mental manipulation task;completing a verbal sequencing task;80%;with minimal cues (75-90%)  -VO     Time Frame (Thought Organization Skills Goal 1, SLP) -- short term goal (STG)  -VO     Progress/Outcomes (Thought Organization Skills Goal 1, SLP) -- new goal  -VO            Reasoning Goal 1 (SLP)    Improve Reasoning Through Goal 1 (SLP) -- complete mental flexibility task;complete high level reasoning  task;80%;with minimal cues (75-90%)  -VO     Progress/Outcomes (Reasoning Goal 1, SLP) -- new goal  -VO            Additional Goal 1 (SLP)    Additional Goal 1, SLP -- Pt will improve cognitive communication skills in order to allow optimal participation in care and safety w/ ADLs.  -VO     Time Frame (Additional Goal 1, SLP) -- by discharge  -VO     Progress/Outcomes (Additional Goal 1, SLP) -- new goal  -VO           User Key  (r) = Recorded By, (t) = Taken By, (c) = Cosigned By    Initials Name Provider Type    VO Perlita Benavidez MA,CCC-SLP Speech and Language Pathologist                   Time Calculation:    Time Calculation- SLP     Row Name 07/13/22 0953             Time Calculation- SLP    SLP Start Time 0845  -VO      SLP Received On 07/13/22  -VO              Untimed Charges    11474-EQ Eval Speech and Production w/ Language Minutes 38  -VO      95540-LX Eval Oral Pharyng Swallow Minutes 30  -VO              Total Minutes    Untimed Charges Total Minutes 68  -VO       Total Minutes 68  -VO            User Key  (r) = Recorded By, (t) = Taken By, (c) = Cosigned By    Initials Name Provider Type    VO Perlita Benavidez MA,CCC-SLP Speech and Language Pathologist                Therapy Charges for Today     Code Description Service Date Service Provider Modifiers Qty    09523389994 HC ST EVAL ORAL PHARYNG SWALLOW 2 7/13/2022 Perlita Benavidez MA,CCC-SLP GN 1    [id number] HC ST EVAL SPEECH AND PROD W LANG  3 7/13/2022 Perlita Benavidez MA,CCC-SLP GN 1               Perlita Benavidez MA,CCC-SLP  7/13/2022

## 2022-07-13 NOTE — PLAN OF CARE
Goal Outcome Evaluation: pt alert and oriented. Vss, on br, neuro checks per TPA protocol. EEG this am, 3min episode of decreasing sat, word finding difficulty, then resolved to baseline, eeg left on continuosly until this pm after speaking with stroke navigator. Pt and wife updated and support given

## 2022-07-14 ENCOUNTER — APPOINTMENT (OUTPATIENT)
Dept: CT IMAGING | Facility: HOSPITAL | Age: 72
End: 2022-07-14

## 2022-07-14 ENCOUNTER — OFFICE VISIT (OUTPATIENT)
Dept: CARDIOLOGY | Facility: HOSPITAL | Age: 72
End: 2022-07-14

## 2022-07-14 ENCOUNTER — HOSPITAL ENCOUNTER (OUTPATIENT)
Dept: CARDIOLOGY | Facility: HOSPITAL | Age: 72
Discharge: HOME | End: 2022-07-14

## 2022-07-14 VITALS
RESPIRATION RATE: 18 BRPM | TEMPERATURE: 96.5 F | OXYGEN SATURATION: 98 % | HEART RATE: 70 BPM | HEIGHT: 72 IN | WEIGHT: 221.25 LBS | DIASTOLIC BLOOD PRESSURE: 69 MMHG | SYSTOLIC BLOOD PRESSURE: 131 MMHG | BODY MASS INDEX: 29.97 KG/M2

## 2022-07-14 VITALS
WEIGHT: 225.75 LBS | SYSTOLIC BLOOD PRESSURE: 118 MMHG | TEMPERATURE: 97.6 F | OXYGEN SATURATION: 100 % | DIASTOLIC BLOOD PRESSURE: 69 MMHG | HEIGHT: 72 IN | HEART RATE: 67 BPM | BODY MASS INDEX: 30.58 KG/M2 | RESPIRATION RATE: 18 BRPM

## 2022-07-14 DIAGNOSIS — I48.0 PAROXYSMAL ATRIAL FIBRILLATION: Primary | ICD-10-CM

## 2022-07-14 DIAGNOSIS — I35.0 AORTIC STENOSIS, MODERATE: ICD-10-CM

## 2022-07-14 DIAGNOSIS — I10 PRIMARY HYPERTENSION: ICD-10-CM

## 2022-07-14 DIAGNOSIS — I48.0 PAROXYSMAL ATRIAL FIBRILLATION: ICD-10-CM

## 2022-07-14 LAB
ALBUMIN SERPL-MCNC: 3.8 G/DL (ref 3.5–5.2)
ALBUMIN/GLOB SERPL: 1.9 G/DL
ALP SERPL-CCNC: 64 U/L (ref 39–117)
ALT SERPL W P-5'-P-CCNC: 17 U/L (ref 1–41)
ANION GAP SERPL CALCULATED.3IONS-SCNC: 10 MMOL/L (ref 5–15)
AST SERPL-CCNC: 21 U/L (ref 1–40)
BASOPHILS # BLD AUTO: 0.07 10*3/MM3 (ref 0–0.2)
BASOPHILS NFR BLD AUTO: 0.7 % (ref 0–1.5)
BILIRUB SERPL-MCNC: 1.2 MG/DL (ref 0–1.2)
BUN SERPL-MCNC: 13 MG/DL (ref 8–23)
BUN/CREAT SERPL: 16.9 (ref 7–25)
CALCIUM SPEC-SCNC: 9.1 MG/DL (ref 8.6–10.5)
CHLORIDE SERPL-SCNC: 108 MMOL/L (ref 98–107)
CO2 SERPL-SCNC: 23 MMOL/L (ref 22–29)
CREAT SERPL-MCNC: 0.77 MG/DL (ref 0.76–1.27)
DEPRECATED RDW RBC AUTO: 41.2 FL (ref 37–54)
EGFRCR SERPLBLD CKD-EPI 2021: 95.7 ML/MIN/1.73
EOSINOPHIL # BLD AUTO: 0.37 10*3/MM3 (ref 0–0.4)
EOSINOPHIL NFR BLD AUTO: 3.5 % (ref 0.3–6.2)
ERYTHROCYTE [DISTWIDTH] IN BLOOD BY AUTOMATED COUNT: 11.7 % (ref 12.3–15.4)
GLOBULIN UR ELPH-MCNC: 2 GM/DL
GLUCOSE SERPL-MCNC: 112 MG/DL (ref 65–99)
HCT VFR BLD AUTO: 36.8 % (ref 37.5–51)
HGB BLD-MCNC: 12.6 G/DL (ref 13–17.7)
IMM GRANULOCYTES # BLD AUTO: 0.06 10*3/MM3 (ref 0–0.05)
IMM GRANULOCYTES NFR BLD AUTO: 0.6 % (ref 0–0.5)
LYMPHOCYTES # BLD AUTO: 1.34 10*3/MM3 (ref 0.7–3.1)
LYMPHOCYTES NFR BLD AUTO: 12.8 % (ref 19.6–45.3)
MAGNESIUM SERPL-MCNC: 2.1 MG/DL (ref 1.6–2.4)
MCH RBC QN AUTO: 33.1 PG (ref 26.6–33)
MCHC RBC AUTO-ENTMCNC: 34.2 G/DL (ref 31.5–35.7)
MCV RBC AUTO: 96.6 FL (ref 79–97)
MONOCYTES # BLD AUTO: 0.96 10*3/MM3 (ref 0.1–0.9)
MONOCYTES NFR BLD AUTO: 9.2 % (ref 5–12)
NEUTROPHILS NFR BLD AUTO: 7.65 10*3/MM3 (ref 1.7–7)
NEUTROPHILS NFR BLD AUTO: 73.2 % (ref 42.7–76)
NRBC BLD AUTO-RTO: 0 /100 WBC (ref 0–0.2)
PHOSPHATE SERPL-MCNC: 2.4 MG/DL (ref 2.5–4.5)
PLATELET # BLD AUTO: 187 10*3/MM3 (ref 140–450)
PMV BLD AUTO: 10.4 FL (ref 6–12)
POTASSIUM SERPL-SCNC: 3.8 MMOL/L (ref 3.5–5.2)
PROT SERPL-MCNC: 5.8 G/DL (ref 6–8.5)
QT INTERVAL: 418 MS
QTC INTERVAL: 438 MS
RBC # BLD AUTO: 3.81 10*6/MM3 (ref 4.14–5.8)
SODIUM SERPL-SCNC: 141 MMOL/L (ref 136–145)
WBC NRBC COR # BLD: 10.45 10*3/MM3 (ref 3.4–10.8)

## 2022-07-14 PROCEDURE — 99204 OFFICE O/P NEW MOD 45 MIN: CPT | Performed by: NURSE PRACTITIONER

## 2022-07-14 PROCEDURE — 99239 HOSP IP/OBS DSCHRG MGMT >30: CPT | Performed by: NURSE PRACTITIONER

## 2022-07-14 PROCEDURE — 93005 ELECTROCARDIOGRAM TRACING: CPT | Performed by: NURSE PRACTITIONER

## 2022-07-14 PROCEDURE — 85025 COMPLETE CBC W/AUTO DIFF WBC: CPT | Performed by: INTERNAL MEDICINE

## 2022-07-14 PROCEDURE — 80053 COMPREHEN METABOLIC PANEL: CPT | Performed by: INTERNAL MEDICINE

## 2022-07-14 PROCEDURE — 99233 SBSQ HOSP IP/OBS HIGH 50: CPT | Performed by: STUDENT IN AN ORGANIZED HEALTH CARE EDUCATION/TRAINING PROGRAM

## 2022-07-14 PROCEDURE — 84100 ASSAY OF PHOSPHORUS: CPT | Performed by: INTERNAL MEDICINE

## 2022-07-14 PROCEDURE — 97162 PT EVAL MOD COMPLEX 30 MIN: CPT

## 2022-07-14 PROCEDURE — 93010 ELECTROCARDIOGRAM REPORT: CPT | Performed by: INTERNAL MEDICINE

## 2022-07-14 PROCEDURE — 25010000002 LEVETIRACETAM IN NACL 0.82% 500 MG/100ML SOLUTION: Performed by: STUDENT IN AN ORGANIZED HEALTH CARE EDUCATION/TRAINING PROGRAM

## 2022-07-14 PROCEDURE — 83735 ASSAY OF MAGNESIUM: CPT | Performed by: INTERNAL MEDICINE

## 2022-07-14 PROCEDURE — 70450 CT HEAD/BRAIN W/O DYE: CPT

## 2022-07-14 RX ORDER — ASPIRIN 81 MG/1
81 TABLET ORAL DAILY
Qty: 30 TABLET | Refills: 2 | Status: SHIPPED | OUTPATIENT
Start: 2022-07-14

## 2022-07-14 RX ORDER — LEVETIRACETAM 500 MG/1
500 TABLET ORAL 2 TIMES DAILY
Qty: 60 TABLET | Refills: 2 | Status: SHIPPED | OUTPATIENT
Start: 2022-07-14 | End: 2022-10-18 | Stop reason: SDUPTHER

## 2022-07-14 RX ADMIN — FAMOTIDINE 20 MG: 20 TABLET ORAL at 08:25

## 2022-07-14 RX ADMIN — LEVETIRACETAM 500 MG: 5 INJECTION INTRAVASCULAR at 08:23

## 2022-07-14 RX ADMIN — POTASSIUM & SODIUM PHOSPHATES POWDER PACK 280-160-250 MG 2 PACKET: 280-160-250 PACK at 11:54

## 2022-07-14 RX ADMIN — Medication 10 ML: at 08:25

## 2022-07-14 RX ADMIN — LEVETIRACETAM 500 MG: 5 INJECTION INTRAVASCULAR at 02:52

## 2022-07-14 RX ADMIN — ASPIRIN 325 MG ORAL TABLET 325 MG: 325 PILL ORAL at 06:22

## 2022-07-14 NOTE — PROGRESS NOTES
[facility] Heart Monitor Documentation    Priyank Small  1950  [id number]  07/14/22      [] ZIO XT Patch  Model [id number] Prescribed for N/A Days    · Serial Number: (N + 9 Digits) N   · Apply-By Date on Box:   · USPS Tracking Number:   · USPS Tracking        [] [employer] MINI PLUS Mobile Cardiac Telemetry  Model [id number] Prescribed for 30 Days    · Serial Number: (BGM + 7 Digits) [id number]  · Shipped-By Date on Box: 7/12/22  · UPS Tracking Number: [id number]  · UPS Tracking      [] [employer] MINI Holter Monitor  Model BGMINIEL Prescribed for N/A Days    · Serial Number: (7 Digits)   · Shipped-By Date on Box:   · UPS Tracking Number: [id number]  · UPS Tracking        This monitor was applied to the patient's chest and checked for proper functioning.  Mr. Priyank Small was instructed in the proper use of this monitor.  He was given the opportunity to ask questions and left the office with the device 's instruction manual.    Guicho Hopper MA, 15:08 EDT, 07/14/22                  [facility] 8[facility]

## 2022-07-14 NOTE — THERAPY DISCHARGE NOTE
Patient Name: Priyank Small  : 1950    MRN: [id number]                              Today's Date: 2022       Admit Date: 2022    Visit Dx:     ICD-10-CM ICD-9-CM   1. Cognitive communication deficit  R41.841 799.52   2. Dysphagia, unspecified type  R13.10 787.20     Patient Active Problem List   Diagnosis   • Stroke ruled out   • Seizure    • Possible Paroxysmal atrial fibrillation    • HTN (hypertension)   • PARVIN (obstructive sleep apnea)   • Alcohol use     Past Medical History:   Diagnosis Date   • Arthritis    • Cancer (HCC)    • HTN (hypertension)    • PARVIN (obstructive sleep apnea)     CPAP compliance     Past Surgical History:   Procedure Laterality Date   • KNEE ARTHROPLASTY Right    • SHOULDER SURGERY Left     Fractured humerus   • TONSILLECTOMY        General Information     Row Name 22 1238          Physical Therapy Time and Intention    Document Type discharge evaluation/summary (P)   -WJ     Mode of Treatment physical therapy (P)   -WJ     Row Name 22 1238          General Information    Patient Profile Reviewed yes (P)   -WJ     Prior Level of Function independent:;all household mobility;community mobility;gait;transfer;bed mobility;ADL's (P)   -WJ     Existing Precautions/Restrictions fall;seizures (P)   -WJ     Barriers to Rehab medically complex (P)   -WJ     Row Name 22 1238          Living Environment    People in Home spouse (P)   -WJ     Row Name 22 1238          Home Main Entrance    Number of Stairs, Main Entrance three (P)   -WJ     Stair Railings, Main Entrance railings safe and in good condition (P)   -WJ     Row Name 22 1238          Stairs Within Home, Primary    Stairs, Within Home, Primary 3 wide steps to enter (P)   -WJ     Number of Stairs, Within Home, Primary ten (P)   -WJ     Stair Railings, Within Home, Primary railings safe and in good condition (P)   -WJ     Stairs Comment, Within Home, Primary Pt reports they live in 2 [place] home,  but able to complete all ADLs on first floor (P)   -WJ     Row Name 07/14/22 1238          Cognition    Orientation Status (Cognition) oriented x 4 (P)   -WJ     Row Name 07/14/22 1238          Safety Issues, Functional Mobility    Impairments Affecting Function (Mobility) balance;endurance/activity tolerance (P)   -WJ     Comment, Safety Issues/Impairments (Mobility) Pt w/ mild instability during ambulation (P)   -WJ           User Key  (r) = Recorded By, (t) = Taken By, (c) = Cosigned By    Initials Name Provider Type    WJ Jonathan Warner, PT Student PT Student               Mobility     Row Name 07/14/22 1240          Bed Mobility    Bed Mobility supine-sit (P)   -WJ     Supine-Sit Vernon (Bed Mobility) modified independence (P)   -WJ     Assistive Device (Bed Mobility) bed rails;head of bed elevated (P)   -WJ     Comment, (Bed Mobility) Pt w/ mod independent w/ bed mobility, needing cues to reach contralateral UE for bed rail (P)   -WJ     Row Name 07/14/22 1240          Sit-Stand Transfer    Sit-Stand [place] (Transfers) standby assist (P)   -WJ     Comment, (Sit-Stand Transfer) Pt SBA STS, pt w/ good balance and strength, did not need cueing (P)   -WJ     Row Name 07/14/22 1240          Gait/Stairs (Locomotion)    [place]on Level (Gait) standby assist (P)   -WJ     Assistive Device (Gait) other (see comments) (P)   unilateral UE A on telemon  -WJ     Distance in Feet (Gait) 400 (P)   -WJ     Deviations/Abnormal Patterns (Gait) gait speed decreased;stride length decreased;bilateral deviations (P)   -WJ     Bilateral Gait Deviations forward flexed posture (P)   -WJ     [place] Level (Stairs) contact guard (P)   -WJ     Handrail Location (Stairs) left side (ascending);right side (descending) (P)   -WJ     Number of Steps (Stairs) 4 (P)   -WJ     Ascending Technique (Stairs) step-over-step (P)   -WJ     Descending Technique (Stairs) step-to-step (P)   -WJ     Comment, (Gait/Stairs) Pt w/ good  mechanics during ambulation w/ step through gait pattern, needing min cues for upright posture. Pt ascended stairs step-over-step well w/ and w/o UE A on railing. Pt able to descend stairs w/ R UE A on hand rail and using step-to-step pattern safely, CGA x 1. Pt did not demostrate significant fatigue after ambulation. (P)   -WJ           User Key  (r) = Recorded By, (t) = Taken By, (c) = Cosigned By    Initials Name Provider Type    WJ Jonathan Warner, PT Student PT Student               Obj/Interventions     Row Name 07/14/22 1244          Range of Motion Comprehensive    General Range of Motion bilateral lower extremity ROM WNL (P)   -     Row Name 07/14/22 1244          Strength Comprehensive (MMT)    General Manual Muscle Testing (MMT) Assessment lower extremity strength deficits identified (P)   -WJ     Comment, General Manual Muscle Testing (MMT) Assessment pt grossly 4/5 B LE strength (P)   -     Row Name 07/14/22 1244          Balance    Balance Assessment sitting static balance;sitting dynamic balance;sit to stand dynamic balance;standing static balance;standing dynamic balance (P)   -WJ     Static Sitting Balance independent (P)   -WJ     Dynamic Sitting Balance modified independence (P)   -WJ     Position, Sitting Balance unsupported;sitting edge of bed (P)   -WJ     Sit to Stand Dynamic Balance standby assist (P)   -WJ     Static Standing Balance standby assist (P)   -WJ     Dynamic Standing Balance standby assist (P)   -WJ     Position/Device Used, Standing Balance supported (P)   Unilateral UE A on telemon  -WJ     Balance Interventions sitting;standing;sit to stand;static;dynamic;minimal challenge (P)   -WJ     Comment, Balance Pt w/ good balance in standing, during ambulation, and ascending/descending stairs, pt w/o LOB but mild unsteadiness noted during balance assessment. Pt completed ambulation backwards, forwards w/ horizontal head turns, vertical nods, and eyes closed w/o LOB. Pt w/  excellent recovery of balance w/ perturbations anteriorly, posteriorly, and laterally x 2 each direction. (P)   -WJ     Row Name 07/14/22 1244          Sensory Assessment (Somatosensory)    Sensory Assessment (Somatosensory) sensation intact (P)   -WJ           User Key  (r) = Recorded By, (t) = Taken By, (c) = Cosigned By    Initials Name Provider Type    WJ Jonathan Warner, PT Student PT Student               Goals/Plan    No documentation.                Clinical Impression     Row Name 07/14/22 1249          Pain    Pretreatment Pain Rating 0/10 - no pain (P)   -WJ     Posttreatment Pain Rating 0/10 - no pain (P)   -WJ     Row Name 07/14/22 1249          Plan of Care Review    Plan of Care Reviewed With patient;spouse (P)   -WJ     Progress no change (P)   -WJ     Outcome Evaluation PT eval completed. Pt w/ good strength, functional mobility, and balance in standing, during ambulation, and ascending/descending stairs. Pt ambulated 400 ft SBA using unilateral UE A on telemon, and ascended/desceded stairs safely CGA. During balance assessment, pt completed ambulation backwards, forwards w/ horizontal head turns, vertical nods, and eyes closed w/o LOB, but mild intermittent unsteadiness noted. Pt w/ excellent recovery of balance w/ perturbations anteriorly, posteriorly, and laterally x 2 each direction. Overall, pt w/o significant functional deficits that warrent skilled IPPT. PT rec d/c home w/ assist and OPPT. (P)   -WJ     Row Name 07/14/22 1249          Therapy Assessment/Plan (PT)    Patient/Family Therapy Goals Statement (PT) To go home. (P)   -WJ     Criteria for Skilled Interventions Met (PT) no;no problems identified which require skilled intervention (P)   -WJ     Therapy Frequency (PT) evaluation only (P)   -WJ     Row Name 07/14/22 1249          Vital Signs    Pre Systolic BP Rehab 108 (P)   -WJ     Pre Treatment Diastolic BP 55 (P)   -WJ     Post Systolic BP Rehab 112 (P)   -WJ     Post Treatment  Diastolic BP 60 (P)   -WJ     Pretreatment Heart Rate (beats/min) 69 (P)   -WJ     Posttreatment Heart Rate (beats/min) 73 (P)   -WJ     Pre SpO2 (%) 100 (P)   -WJ     O2 Delivery Pre Treatment room air (P)   -WJ     O2 Delivery Intra Treatment room air (P)   -WJ     Post SpO2 (%) 100 (P)   -WJ     O2 Delivery Post Treatment room air (P)   -WJ     Pre Patient Position Supine (P)   -WJ     Intra Patient Position Standing (P)   -WJ     Post Patient Position Sitting (P)   -WJ     Row Name 07/14/22 1249          Positioning and Restraints    Pre-Treatment Position in bed (P)   -WJ     Post Treatment Position chair (P)   -WJ     In Chair notified nsg;reclined;sitting;call light within reach;encouraged to call for assist;exit alarm on;with family/caregiver;waffle cushion (P)   -WJ           User Key  (r) = Recorded By, (t) = Taken By, (c) = Cosigned By    Initials Name Provider Type    WJonathan Ramirez, PT Student PT Student               Outcome Measures     Row Name 07/14/22 1253 07/14/22 0800       How much help from another person do you currently need...    Turning from your back to your side while in flat bed without using bedrails? 4 (P)   -WJ 4  -MP    Moving from lying on back to sitting on the side of a flat bed without bedrails? 4 (P)   -WJ 3  -MP    Moving to and from a bed to a chair (including a wheelchair)? 3 (P)   -WJ 2  -MP    Standing up from a chair using your arms (e.g., wheelchair, bedside chair)? 3 (P)   -WJ 2  -MP    Climbing 3-5 steps with a railing? 3 (P)   -WJ 2  -MP    To walk in hospital room? 3 (P)   -WJ 2  -MP    AM-PAC 6 Clicks Score (PT) 20 (P)   -WJ 15  -MP    Highest level of mobility 6 --> Walked 10 steps or more (P)   -WJ 4 --> Transferred to chair/commode  -MP    Row Name 07/14/22 1253          Functional Assessment    Outcome Measure Options AM-PAC 6 Clicks Basic Mobility (PT) (P)   -WJ           User Key  (r) = Recorded By, (t) = Taken By, (c) = Cosigned By    Initials Name  Provider Type    MP Karen Herrmann, RN Registered Nurse     Jonathan Warner, PT Student PT Student              Physical Therapy Education                 Title: PT OT SLP Therapies (Done)     Topic: Physical Therapy (Done)     Point: Mobility training (Done)     Learning Progress Summary           Patient Acceptance, E, VU,DU by REGINA at 7/14/2022 1253                   Point: Body mechanics (Done)     Learning Progress Summary           Patient Acceptance, E, VU,DU by MICHELLE at 7/14/2022 1253                   Point: Precautions (Done)     Learning Progress Summary           Patient Acceptance, E, VU,DU by  at 7/14/2022 1253                               User Key     Initials Effective Dates Name Provider Type Discipline     05/31/22 -  Jonathan Warner, PT Student PT Student PT              PT Recommendation and Plan     Plan of Care Reviewed With: (P) patient, spouse  Progress: (P) no change  Outcome Evaluation: (P) PT eval completed. Pt w/ good strength, functional mobility, and balance in standing, during ambulation, and ascending/descending stairs. Pt ambulated 400 ft SBA using unilateral UE A on telemon, and ascended/desceded stairs safely CGA. During balance assessment, pt completed ambulation backwards, forwards w/ horizontal head turns, vertical nods, and eyes closed w/o LOB, but mild intermittent unsteadiness noted. Pt w/ excellent recovery of balance w/ perturbations anteriorly, posteriorly, and laterally x 2 each direction. Overall, pt w/o significant functional deficits that warrent skilled IPPT. PT rec d/c home w/ assist and OPPT.     Time Calculation:    PT Charges     Row Name 07/14/22 1252             Time Calculation    Start Time 0950 (P)   -WJ      PT Received On 07/14/22 (P)   -WJ              Untimed Charges    PT Eval/Re-eval Minutes 46 (P)   -WJ              Total Minutes    Untimed Charges Total Minutes 46 (P)   -WJ       Total Minutes 46 (P)   -WJ            User Key  (r) = Recorded By, (t)  = Taken By, (c) = Cosigned By    Initials Name Provider Type    WJonathan Ramirez, PT Student PT Student              Therapy Charges for Today     Code Description Service Date Service Provider Modifiers Qty    08029897609 HC PT EVAL MOD COMPLEXITY 4 7/14/2022 Jonathan Warner, PT Student GP 1          PT G-Codes  Outcome Measure Options: (P) AM-PAC 6 Clicks Basic Mobility (PT)  AM-PAC 6 Clicks Score (PT): (P) 20    PT Discharge Summary  Anticipated Discharge Disposition (PT): (P) home with assist, home with outpatient therapy services    JONATHAN WARNER PT Student  7/14/2022

## 2022-07-14 NOTE — PLAN OF CARE
Goal Outcome Evaluation:  Plan of Care Reviewed With: (P) patient, spouse        Progress: (P) no change  Outcome Evaluation: (P) PT eval completed. Pt w/ good strength, functional mobility, and balance in standing, during ambulation, and ascending/descending stairs. Pt ambulated 400 ft SBA using unilateral UE A on tele monitor, and ascended/desceded stairs safely CGA. During balance assessment, pt completed ambulation backwards, forwards w/ horizontal head turns, vertical nods, and eyes closed w/o LOB, but mild intermittent unsteadiness noted. Pt w/ excellent recovery of balance w/ perturbations anteriorly, posteriorly, and laterally x 2 each direction. Overall, pt w/o significant functional deficits that warrent skilled IPPT. PT rec d/c home w/ assist and OPPT.

## 2022-07-14 NOTE — PROGRESS NOTES
Chief Complaint  Establish Care and Atrial Fibrillation    Subjective      History of Present Illness {CC  Problem List  Visit  Diagnosis   Encounters  Notes  Medications  Labs  Result Review Imaging  Media :23}     Priyank Small, 71 y.o. male with aortic stenosis presents to [facility] for Establish Care and Atrial Fibrillation.    Patient was recently hospitalized at [facility] for suspected CVA. MRI Brain was negative for stroke.  CT 24 hours s/p TNK was negative for hemorrhage. TTE revealed normal LV systolic function, borderline concentric hypertrophy, moderate aortic valve calcification/stenosis.  EEG showed electrographic seizure origin in the left posterior temporal region.  Neurology felt that this could have been provoked in the setting of stress and lack of sleep.  Patient was reported back to baseline prior to discharge and cleared to go home.  Neurology instructed to continue Keppra 500 mg BID.  He has been instructed to not drive for 90 days after seizure per [facility], [place] law.  Apparently, he had atrial fibrillation with RVR in [place] per their ED records. However, there is no EKG from there confirming this and we have not seen any evidence of it here. He was instructed to be discharged home with an event monitor per cardiology. Patient follows with Dr. Mir at [facility] and is scheduled November 11 for follow-up and echocardiogram.     Patient presents immediately after hospital discharge for cardiac monitor placement. He denies previous symptoms of arrhythmia, palpitation/tachypalpitation. He denies chest pain, dyspnea, near syncope/syncope. Very happy to be returning home after recent hospitalization.       Objective     Vital Signs:   Vitals:    07/14/22 1411   BP: 131/69   BP Location: Left arm   Patient Position: Sitting   Cuff Size: Adult   Pulse: 70   Resp: 18   Temp: 96.5 °F (35.8 °C)   TempSrc: Temporal   SpO2: 98%   Weight: 100 kg  "(221 lb 4 oz)   Height: 182.9 cm (72\")     Body mass index is 30.01 kg/m².  Physical Exam  Vitals and nursing note reviewed.   Constitutional:       Appearance: Normal appearance.   HENT:      Head: Normocephalic.   Eyes:      Extraocular Movements: Extraocular movements intact.   Neck:      Vascular: No carotid bruit.   Cardiovascular:      Rate and Rhythm: Normal rate and regular rhythm.      Pulses: Normal pulses.      Heart sounds: Normal heart sounds, S1 normal and S2 normal. No murmur heard.  Pulmonary:      Effort: Pulmonary effort is normal. No respiratory distress.      Breath sounds: Normal breath sounds.   Musculoskeletal:      Cervical back: Neck supple.      Right lower leg: No edema.      Left lower leg: No edema.   Skin:     General: Skin is warm and dry.   Neurological:      General: No focal deficit present.      Mental Status: He is alert.   Psychiatric:         Mood and Affect: Mood normal.         Behavior: Behavior normal.         Thought Content: Thought content normal.        Data Reviewed:{ Labs  Result Review  Imaging  Med Tab  Media :23}     Discharge Summary by Rona Mortensen APRN (07/14/2022 13:38)    ECG 12 Lead (07/14/2022 12:01)  Adult Transthoracic Echo Complete W/ Cont if Necessary Per Protocol (With Agitated Saline) (07/13/2022 13:05)    CBC & Differential (07/14/2022 05:13)  Comprehensive Metabolic Panel (07/14/2022 05:13)  Magnesium (07/14/2022 05:13)  Phosphorus (07/14/2022 05:13)  Lipid Panel (07/13/2022 08:32)      Assessment & Plan   Assessment and Plan {CC Problem List  Visit Diagnosis  ROS  Review (Popup)  [facility] Maintenance  Quality  BestPractice  Medications  SmartSets  SnapShot Encounters  Media :23}     1. Paroxysmal atrial fibrillation (HCC)  -Apparently, he had atrial fibrillation with RVR in [place] per their ED records. However, there was no EKG from there confirming this and no evidence during hospitalization at [facility]. "   -Patient was discharged home with an event monitor per cardiology and ASA 81mg daily  -CHADSVASC:2. Given CHADS score discussed recommendation for anticoagulation for stroke prevention given stroke risk. Patient prefers to maintain on ASA 81mg daily as prescribed at discharge and AF surveillance with 30-day MCOT; receptive of anticoagulation if atrial fibrillation observed on cardiac monitor.  -Continue 81mg ASA  - Mobile Cardiac Outpatient Telemetry for arrhythmia surveillance  -Follow-up in 6 weeks for monitor results  -Discussed contacting primary cardiology for hospital follow-up    2. Primary hypertension  -Reasonable control at time of visit    3. Aortic stenosis, moderate  -TTE during hospitalization with preserved LV systolic function, moderate aortic stenosis. Reports follows with [facility] cardiology with surveillance TTE scheduled  -Denies cardiac symptoms as in HPI  -Continue f/u with primary cardiology.       Follow Up {Instructions Charge Capture  Follow-up Communications :23}     Return in about 6 weeks (around 8/25/2022) for Office follow-up, Monitor results.      Patient was given instructions and counseling regarding his condition or for health maintenance advice. Please see specific information pulled into the AVS if appropriate.  Patient was instructed to call the [facility] with any questions, concerns, or worsening symptoms.    Dictated Utilizing Dragon Dictation   Please note that portions of this note were completed with a voice recognition program.   Part of this note may be an electronic transcription/translation of spoken language to printed text using the Dragon Dictation System.

## 2022-07-14 NOTE — CASE MANAGEMENT/SOCIAL WORK
Case Management Discharge Note      Final Note:     I anticipate Mr. Small to be medically ready for discharge soon. Physical therapy recommend outpatient therapy at discharge. Mr. Small is agreeable to this discharge plan. I have provided him an order for physical therapy for him to present to the agency of his choice. His wife Pilar is available to transport Mr. Small home by private vehicle at the time of discharge. No further needs identified.            Transportation Services  Private: Car    Final Discharge Disposition Code: 01 - home or self-care

## 2022-07-14 NOTE — DISCHARGE SUMMARY
"Discharge Summary    Patient name: Priyank Small  CSN: [id number]  MRN: [id number]  : 1950  Today's date: 2022     Date of Admission: 2022  Date of Discharge:  2022    Admitting Physician:  Dr. Jaramillo  Primary Care Provider: Ailyn Light MD  Consultations:   Neurology:  Dr. Montoya    Admission Diagnosis:  Suspected CVA     Discharge Diagnoses:   Active Hospital Problems    Diagnosis    • **Seizure     • Possible Paroxysmal atrial fibrillation     • HTN (hypertension)    • PARVIN (obstructive sleep apnea)      CPAP compliance     • Alcohol use      Allergies:  Patient has no known allergies.    Code Status and Medical Interventions:   Ordered at: 22 0611     Code Status (Patient has no pulse and is not breathing):    CPR (Attempt to Resuscitate)     Medical Interventions (Patient has pulse or is breathing):    Full Support     Procedures/Testin22 cEEG - One electrographic seizure, origin left posterior temporal, with clinical correlation of speech changes and confusion, lasting approximately 1 minute.  Underlying background appears normal in the awake and drowsy states    22 TTE - Left ventricular ejection fraction appears to be 61 - 65%. Left ventricular systolic function is normal.  Left ventricular wall thickness is consistent with borderline concentric hypertrophy.  Left atrial volume is mildly increased.  There is moderate calcification of the aortic valve.  Moderate aortic valve stenosis is present.  Mild tricuspid valve regurgitation is present.    History of Present Illness:  Priyank Small is a 71-year-old male with past medical history significant for hypertension and PARVIN (CPAP compliant) who presented to the ED at [facility] for a possible seizure.  He was lying in bed with his wife when she noted \"jerking movements\" and tried to awaken him but was unsuccessful. She called 911 and when EMS arrived he awoke but was confused and appeared to " "be post-ictal. He received a loading dose of Keppra while at the OSH. He was initially alert and oriented with no neurologic deficits but  at 0310 he became acutely confused and aphasic. Stroke work-up was initiated and initial NIH was 5. He received TNK for presumed stroke.  While in the ED he was noted to have new onset of A. fib with RVR. He states he has no prior history of seizures.      On arrival to [facility] he is alert and oriented and has no aphasia but has right upper extremity drift and right facial droop. He states that his speech is better and he feels at baseline. He has no previous history of A-Fib and no family history of stroke. He is normally quite active and plays golf several times a week.     Hospital Course:  MRI Brain was negative for stroke.  CT 24 hours s/p TNK was negative for hemorrhage.  He had an ECHO with results above.  EEG showed electrographic seizure origin in the left posterior temporal region.  Neurology felt that this could have been provoked in the setting of stress and lack of sleep.  He is back to his baseline.  He has been cleared to go home.  Neurology wants him to continue Keppra 500 mg BID.  He has been instructed to not drive for 90 days after seizure per [facility] law.  Apparently, he had atrial fibrillation with RVR in [place] per their ED records.  However, there is no EKG from there confirming this and we have not seen any evidence of it here.  He will be discharged home with an event monitor per Cardiology.  PT recommended outpatient PT.  CM is working on setting that up.        Vitals:  /69   Pulse 67   Temp 97.6 °F (36.4 °C) (Oral)   Resp 18   Ht 182.9 cm (72\")   Wt 102 kg (225 lb 12 oz)   SpO2 100%   BMI 30.62 kg/m²     Physical Exam:  Constitutional:  Appears well-developed and well-nourished. No distress.  Sitting on edge of bed, getting ready to work with PT  HEENT:  Normocephalic and atraumatic. PERRL  Neck:  Neck supple. No JVD present.   CV: Normal " rate, regular rhythm,  intact distal pulses.  No gallop, murmur or rub.  Pulmonary/Chest: Effort normal and breath sounds normal. No respiratory distress. No wheezes, rhonchi or rales.   Abdominal: Soft. +BS. No distension and no mass. There is no tenderness.   Musculoskeletal: Normal muscle tone and strength  Neurological: Alert and oriented to person, place, and time.    Skin: Skin is warm and dry. No rash noted.   Extremities:  No clubbing, edema or cyanosis  Psychiatric: Normal mood and affect. Behavior is normal.     Interval:  (post CT)  1a. Level of Consciousness: 0-->Alert, keenly responsive  1b. LOC Questions: 0-->Answers both questions correctly  1c. LOC Commands: 0-->Performs both tasks correctly  2. Best Gaze: 0-->Normal  3. Visual: 0-->No visual loss  4. Facial Palsy: 1-->Minor paralysis (flattened nasolabial fold, asymmetry on smiling)  5a. Motor Arm, Left: 0-->No drift, limb holds 90 (or 45) degrees for full 10 secs  5b. Motor Arm, Right: 0-->No drift, limb holds 90 (or 45) degrees for full 10 secs  6a. Motor Leg, Left: 0-->No drift, leg holds 30 degree position for full 5 secs  6b. Motor Leg, Right: 0-->No drift, leg holds 30 degree position for full 5 secs  7. Limb Ataxia: 0-->Absent  8. Sensory: 0-->Normal, no sensory loss  9. Best Language: 0-->No aphasia, normal  10. Dysarthria: 0-->Normal  11. Extinction and Inattention (formerly Neglect): 0-->No abnormality    Total (NIH Stroke Scale): 1    Labs:  Results from last 7 days   Lab Units 07/14/22  0513   WBC 10*3/mm3 10.45   HEMOGLOBIN g/dL 12.6*   HEMATOCRIT % 36.8*   PLATELETS 10*3/mm3 187     Results from last 7 days   Lab Units 07/14/22  0513   SODIUM mmol/L 141   POTASSIUM mmol/L 3.8   CHLORIDE mmol/L 108*   CO2 mmol/L 23.0   BUN mg/dL 13   CREATININE mg/dL 0.77   CALCIUM mg/dL 9.1   BILIRUBIN mg/dL 1.2   ALK PHOS U/L 64   ALT (SGPT) U/L 17   AST (SGOT) U/L 21   GLUCOSE mg/dL 112*         Magnesium   Date Value Ref Range Status   07/14/2022  2.1 1.6 - 2.4 mg/dL Final     Phosphorus   Date Value Ref Range Status   07/14/2022 2.4 (L) 2.5 - 4.5 mg/dL Final              Discharge Medications      New Medications      Instructions Start Date   aspirin 81 MG EC tablet   81 mg, Oral, Daily      levETIRAcetam 500 MG tablet  Commonly known as: Keppra   500 mg, Oral, 2 Times Daily         Continue These Medications      Instructions Start Date   EQL Omega 3 Fish Oil 1000 MG capsule   1 capsule, Oral, Daily      Garlic 1500 MG capsule   1 capsule, Oral, Daily      JUST TEARS EYE DROPS OP   1 drop, Ophthalmic, Daily      meloxicam 15 MG tablet  Commonly known as: MOBIC   1 tablet, Oral, Daily      MULTIVITAMIN ADULT EXTRA C PO   1 tablet, Oral, Daily           Diet Instructions     Diet: Regular, Cardiac; Thin      Discharge Diet:  Regular  Cardiac       Fluid Consistency: Thin        Activity Instructions     Activity as Tolerated     Additional Activity Instructions:    Activity as tolerated  No Driving per state requiments         Future Appointments   Date Time Provider Department Center   7/14/2022  3:15 PM Shlomo Tian APRN [facility]   9/21/2022  9:00 AM Michelle Stark MD [facility]     Additional Instructions for the Follow-ups that You Need to Schedule     Ambulatory Referral to Physical Therapy Evaluate and treat, Neuro; Full weight bearing   As directed      Specialty needed: Evaluate and treat Neuro    Weight Bearing Status: Full weight bearing         Discharge Follow-up with PCP   As directed       Currently Documented PCP:    Ailyn Ligth MD    PCP Phone Number:    [phone]     Follow Up Details: one week         Discharge Follow-up with Specified Provider: Dr. Stark; 1 Month   As directed      To: Dr. Stark    Follow Up: 1 Month             Discharge Instructions:  Ok to go home today  Scripts sent electronically  Follow up as above  CM set up HH with PT  No driving for 90 days after seizure per [place] state law      VONDA Smith, AGACNP-BC, FNP-BC  Pulmonary & Critical Care Medicine    Time: I spent  45  minutes on this discharge activity which included: face-to-face encounter with the patient, reviewing the data in the system, coordination of the care with the nursing staff as well as consultants, documentation, and entering orders.      CC: Ailyn Light MD

## 2022-07-14 NOTE — PROGRESS NOTES
Stroke Progress Note       Chief Complaint: right sided weakness and speech difficulty    Subjective    Subjective     Subjective:  No acute events overnight   Right leg weakness resolved    Review of Systems   Constitutional: No fatigue  HENT: Negative for nosebleeds and rhinorrhea.    Eyes: Negative for redness.   Respiratory: Negative for cough.    Gastrointestinal: Negative for anal bleeding.   Endocrine: Negative for polydipsia.   Genitourinary: Negative for enuresis and urgency.   Musculoskeletal: Negative for joint swelling.   Neurological: Negative for tremors.   Psychiatric/Behavioral: Negative for hallucinations.     Objective      Temp:  [97.6 °F (36.4 °C)-98.5 °F (36.9 °C)] 97.6 °F (36.4 °C)  Heart Rate:  [53-80] 67  Resp:  [18] 18  BP: (107-147)/() 142/71      NEURO    MENTAL STATUS: AAOx3, memory intact, fund of knowledge appropriate    LANG/SPEECH: Naming and repetition intact, fluent, follows 3-step commands    CRANIAL NERVES:    Pupils equal and reactive, EOMI intact, no gaze deviation, no nystagmus  R facial droop, cough and gag intact, shoulder shrug intact, tongue midline     MOTOR:  Moves all extremities equally    SENSORY: Normal to light touch all throughout     COORDINATION: Normal finger to nose and heel to shin, no tremor, no dysmetria    STATION: Not assessed due to patient condition    GAIT: Not assessed due to patient condition  Results Review:    I reviewed the patient's new clinical results.    Lab Results (last 24 hours)     Procedure Component Value Units Date/Time    Comprehensive Metabolic Panel [[id number]]  (Abnormal) Collected: 07/14/22 0513    Specimen: Blood Updated: 07/14/22 0733     Glucose 112 mg/dL      BUN 13 mg/dL      Creatinine 0.77 mg/dL      Sodium 141 mmol/L      Potassium 3.8 mmol/L      Chloride 108 mmol/L      CO2 23.0 mmol/L      Calcium 9.1 mg/dL      Total Protein 5.8 g/dL      Albumin 3.80 g/dL      ALT (SGPT) 17 U/L      AST (SGOT) 21 U/L      Alkaline  Phosphatase 64 U/L      Total Bilirubin 1.2 mg/dL      Globulin 2.0 gm/dL      Comment: Calculated Result        A/G Ratio 1.9 g/dL      BUN/Creatinine Ratio 16.9     Anion Gap 10.0 mmol/L      eGFR 95.7 mL/min/1.73      Comment: National Kidney Foundation and American Society of Nephrology (ASN) Task Force recommended calculation based on the Chronic Kidney Disease Epidemiology Collaboration (CKD-EPI) equation refit without adjustment for race.       Narrative:      GFR Normal >60  Chronic Kidney Disease <60  Kidney Failure <15      Magnesium [[id number]]  (Normal) Collected: 07/14/22 0513    Specimen: Blood Updated: 07/14/22 0733     Magnesium 2.1 mg/dL     Phosphorus [[id number]]  (Abnormal) Collected: 07/14/22 0513    Specimen: Blood Updated: 07/14/22 0733     Phosphorus 2.4 mg/dL     CBC & Differential [[id number]]  (Abnormal) Collected: 07/14/22 0513    Specimen: Blood Updated: 07/14/22 0624    Narrative:      The following orders were created for panel order CBC & Differential.  Procedure                               Abnormality         Status                     ---------                               -----------         ------                     CBC Auto Differential[[id number]]        Abnormal            Final result                 Please view results for these tests on the individual orders.    CBC Auto Differential [[id number]]  (Abnormal) Collected: 07/14/22 0513    Specimen: Blood Updated: 07/14/22 0624     WBC 10.45 10*3/mm3      RBC 3.81 10*6/mm3      Hemoglobin 12.6 g/dL      Hematocrit 36.8 %      MCV 96.6 fL      MCH 33.1 pg      MCHC 34.2 g/dL      RDW 11.7 %      RDW-SD 41.2 fl      MPV 10.4 fL      Platelets 187 10*3/mm3      Neutrophil % 73.2 %      Lymphocyte % 12.8 %      Monocyte % 9.2 %      Eosinophil % 3.5 %      Basophil % 0.7 %      Immature Grans % 0.6 %      Neutrophils, Absolute 7.65 10*3/mm3      Lymphocytes, Absolute 1.34 10*3/mm3      Monocytes, Absolute 0.96 10*3/mm3       Eosinophils, Absolute 0.37 10*3/mm3      Basophils, Absolute 0.07 10*3/mm3      Immature Grans, Absolute 0.06 10*3/mm3      nRBC 0.0 /100 WBC     Hemoglobin A1c [[id number]]  (Normal) Collected: 07/13/22 0832    Specimen: Blood Updated: 07/13/22 1111     Hemoglobin A1C 4.80 %     Narrative:      Hemoglobin A1C Ranges:    Increased Risk for Diabetes  5.7% to 6.4%  Diabetes                     >= 6.5%  Diabetic Goal                < 7.0%    Lipid Panel [[id number]] Collected: 07/13/22 0832    Specimen: Blood Updated: 07/13/22 1038     Total Cholesterol 165 mg/dL      Triglycerides 65 mg/dL      HDL Cholesterol 54 mg/dL      LDL Cholesterol  98 mg/dL      VLDL Cholesterol 13 mg/dL      LDL/HDL Ratio 1.81    Narrative:      Cholesterol Reference Ranges  (U.S. Department of Health and Human Services ATP III Classifications)    Desirable          <200 mg/dL  Borderline High    200-239 mg/dL  High Risk          >240 mg/dL      Triglyceride Reference Ranges  (U.S. Department of Health and Human Services ATP III Classifications)    Normal           <150 mg/dL  Borderline High  150-199 mg/dL  High             200-499 mg/dL  Very High        >500 mg/dL    HDL Reference Ranges  (U.S. Department of Health and Human Services ATP III Classifications)    Low     <40 mg/dl (major risk factor for CHD)  High    >60 mg/dl ('negative' risk factor for CHD)        LDL Reference Ranges  (U.S. Department of Health and Human Services ATP III Classifications)    Optimal          <100 mg/dL  Near Optimal     100-129 mg/dL  Borderline High  130-159 mg/dL  High             160-189 mg/dL  Very High        >189 mg/dL    Respiratory Panel PCR w/COVID-19(SARS-CoV-2) DARINEL/NANI/SHALINI/PAD/COR/MAD/MEGA In-House, NP Swab in [facility], 3-4 HR TAT - Swab, Nasopharynx [[id number]]  (Normal) Collected: 07/13/22 0647    Specimen: Swab from Nasopharynx Updated: 07/13/22 0819     ADENOVIRUS, PCR Not Detected     Coronavirus 229E Not Detected     Coronavirus HKU1 Not  Detected     Coronavirus NL63 Not Detected     Coronavirus OC43 Not Detected     COVID19 Not Detected     Human Metapneumovirus Not Detected     Human Rhinovirus/Enterovirus Not Detected     Influenza A PCR Not Detected     Influenza B PCR Not Detected     Parainfluenza Virus 1 Not Detected     Parainfluenza Virus 2 Not Detected     Parainfluenza Virus 3 Not Detected     Parainfluenza Virus 4 Not Detected     RSV, PCR Not Detected     Bordetella pertussis pcr Not Detected     Bordetella parapertussis PCR Not Detected     Chlamydophila pneumoniae PCR Not Detected     Mycoplasma pneumo by PCR Not Detected    Narrative:      In the setting of a positive respiratory panel with a viral infection PLUS a negative procalcitonin without other underlying concern for bacterial infection, consider observing off antibiotics or discontinuation of antibiotics and continue supportive care. If the respiratory panel is positive for atypical bacterial infection (Bordetella pertussis, Chlamydophila pneumoniae, or Mycoplasma pneumoniae), consider antibiotic de-escalation to target atypical bacterial infection.        EEG Continuous Monitoring With Video    Result Date: 7/13/2022    One electrographic seizure, origin left posterior temporal, with clinical correlation of speech changes and confusion, lasting approximately 1 minute Underlying background appears normal in the awake and drowsy states This report is transcribed using the Dragon dictation system.     CT Head Without Contrast    Result Date: 7/14/2022  No adverse interval intracranial change, as above. Electronically signed by:  Porter Patterson  7/14/2022 3:05 AM [place]    CT Angiogram Neck    Result Date: 7/13/2022  CTA: At most mild arterial stenoses without large vessel occlusion. Perfusion attempted but could not be successfully performed at the current time due to poor intravenous access. COMMENT: If clinically indicated, and no contraindication, head MRI is  offered for further evaluation. CTA result communicated to JAIRON BAKER at 7/13/2022 3:54 AM [place] [facility]. Electronically signed by:  Porter Patterson  7/13/2022 4:05 AM [place]    MRI Brain Without Contrast    Result Date: 7/13/2022  No evidence of hemorrhage, mass effect or midline shift. No evidence of recent or acute ischemia. Mild global volume loss is present, likely age-related. No significant signal changes.   This report was finalized on 7/13/2022 8:43 PM by Jennifer Ozuna MD.      CT Head Without Contrast Stroke Protocol    Result Date: 7/13/2022  No acute large territorial infarct, acute intracranial hemorrhage, or intracranial mass identified. Mild left mastoid effusion. COMMENT: If clinically indicated, and no contraindication, head MRI is offered for further evaluation. Noncontrast head CT result communicated to JAIRON BAKER at 7/13/2022 3:11 AM [place]. Electronically signed by:  Porter Patterson  7/13/2022 3:13 AM [place]    CT Angiogram Head w AI Analysis of LVO    Result Date: 7/13/2022  CTA: At most mild arterial stenoses without large vessel occlusion. Perfusion attempted but could not be successfully performed at the current time due to poor intravenous access. COMMENT: If clinically indicated, and no contraindication, head MRI is offered for further evaluation. CTA result communicated to JAIRON BAKER at 7/13/2022 3:54 AM [place]. Electronically signed by:  Porter Patterson  7/13/2022 4:05 AM [place]    CT CEREBRAL PERFUSION WITH & WITHOUT CONTRAST    Result Date: 7/13/2022  No perfusion abnormality thought to reflect core infarct or tissue at risk. Electronically signed by:  Porter Patterson  7/13/2022 4:45 AM [place]    Results for orders placed during the hospital encounter of 07/13/22    Adult Transthoracic Echo Complete W/ Cont if Necessary Per Protocol (With Agitated Saline)    Interpretation Summary  · Left ventricular ejection fraction  appears to be 61 - 65%. Left ventricular systolic function is normal.  · Left ventricular wall thickness is consistent with borderline concentric hypertrophy.  · Left atrial volume is mildly increased.  · There is moderate calcification of the aortic valve.  · Moderate aortic valve stenosis is present.  · Mild tricuspid valve regurgitation is present.            Assessment/Plan     Assessment/Plan:  71 with history of hypertension, new onset of atrial fibrillation with RVR presented to [place] with complaint of witnessed seizure activity by the spouse and being postictal.        Acute stroke imaging included CT head, CTA head and neck, CT perfusion which was unremarkable.  MRI brain did not evidence any acute infarct.  EEG showed electrographic seizure origin in the left posterior temporal region.      Focal seizure with loss of awareness, etiology uknown   Loaded with Keppra and maintain Keppra 500 mg BID   No signs of infection/fever, altered mentation ,LP is not indicated at this time   This could be a provoked seizure, in the setting of stress, lack of sleep  No driving for 90 days  Educated on seizure precautions.   Follow up with Dr. Kumar, the epileptologist             New onset atrial fibrillation-  No h/o in the past, no h/o stroke  This was one time  noted at [facility] ED, this could be negative in the setting of acute stress due to a seizure.  Patient is currently not in A. fib  Cardiac event monitor at discharge.   No indication of anticoagulation  Aspirin 81, as primary prophylaxis for stroke               Heriberto Montoya MD  07/14/22  07:46 EDT

## 2022-09-20 PROCEDURE — 93228 REMOTE 30 DAY ECG REV/REPORT: CPT | Performed by: INTERNAL MEDICINE

## 2022-10-18 ENCOUNTER — OFFICE VISIT (OUTPATIENT)
Dept: NEUROLOGY | Facility: CLINIC | Age: 72
End: 2022-10-18

## 2022-10-18 VITALS
BODY MASS INDEX: 31.33 KG/M2 | DIASTOLIC BLOOD PRESSURE: 64 MMHG | HEART RATE: 51 BPM | WEIGHT: 231 LBS | OXYGEN SATURATION: 98 % | SYSTOLIC BLOOD PRESSURE: 124 MMHG

## 2022-10-18 DIAGNOSIS — R56.9 FOCAL SEIZURE: Primary | ICD-10-CM

## 2022-10-18 PROCEDURE — 99215 OFFICE O/P EST HI 40 MIN: CPT | Performed by: PSYCHIATRY & NEUROLOGY

## 2022-10-18 RX ORDER — LEVETIRACETAM 500 MG/1
500 TABLET ORAL 2 TIMES DAILY
Qty: 16 TABLET | Refills: 0 | Status: SHIPPED | OUTPATIENT
Start: 2022-10-18 | End: 2022-10-18 | Stop reason: SDUPTHER

## 2022-10-18 RX ORDER — LEVETIRACETAM 500 MG/1
500 TABLET ORAL 2 TIMES DAILY
Qty: 180 TABLET | Refills: 1 | Status: SHIPPED | OUTPATIENT
Start: 2022-10-18 | End: 2022-10-26 | Stop reason: SDUPTHER

## 2022-10-18 RX ORDER — LISINOPRIL AND HYDROCHLOROTHIAZIDE 20; 12.5 MG/1; MG/1
TABLET ORAL
COMMUNITY
Start: 2022-08-01

## 2022-10-18 NOTE — PROGRESS NOTES
CC: Priyank Small is seen today in consultation at the [facility]       HPI:  71-year-old male with a history of hypertension, chronic alcohol use, PARVIN on CPAP presents with new onset seizures.  As per patient he was sleeping on the night of 7/13 when his wife saw him have whole body shaking with foaming from the mouth.  He has 1 hard liquor drink every day which he had drunk the day before the seizure as well.  Denies being sick at that time.  After that he was taken to [facility] where he returned back to baseline but then after a few hours had an episode of confusion with difficulty speaking.  CT head at that time was unremarkable.  It was felt the patient could be having an acute stroke and given IV tPA.  While in the hospital he also had a brief episode of atrial fibrillation that quickly resolved.  After that he was transferred to [facility] for higher level of care.  Here he had a detailed stroke work-up including a CT head and MRI brain that showed minimal chronic ischemic changes but no acute intracranial abnormalities.  CT angiogram of the head and neck that did not show any significant intra or extracranial stenosis, echocardiogram that showed an EF of 61 to 65% with mild left atrial enlargement and moderate aortic valve stenosis (he does have a cardiologist).  He also had an EEG during which he had 1 electrographic seizure with speech changes and confusion arising from the left posterior temporal region.  LDL was 98 and A1c was 4.8.  After that he was started on Keppra 500 mg twice a day in addition to aspirin that he continues to take.  He has not had any more seizures but he does state that Keppra is causing him to have lack of emotions.  He denies any abnormal birth history, febrile seizures as a child, family history of seizures or any severe concussions.  He did have a Holter monitor after discharge that showed first-degree AV block with right bundle branch block as  well as an SVT but no A. fib.    Of note-I personally reviewed his MRI brain on the hospital notes    The following portions of the patient's history were reviewed today and updated as of 10/18/2022  : allergies, current medications, past family history, past medical history, past social history, past surgical history and problem list  These document will be scanned to patient's chart.      Current Outpatient Medications:   •  Artificial Tear Solution (JUST TEARS EYE DROPS OP), Apply 1 drop to eye(s) as directed by provider Daily., Disp: , Rfl:   •  aspirin (aspirin) 81 MG EC tablet, Take 1 tablet by mouth Daily., Disp: 30 tablet, Rfl: 2  •  Garlic 1500 MG capsule, Take 1 capsule by mouth Daily., Disp: , Rfl:   •  levETIRAcetam (Keppra) 500 MG tablet, Take 1 tablet by mouth 2 (Two) Times a Day., Disp: 180 tablet, Rfl: 1  •  lisinopril-hydrochlorothiazide (PRINZIDE,ZESTORETIC) 20-12.5 MG per tablet, lisinopril 20 mg-hydrochlorothiazide 12.5 mg tablet  TAKE 1 TABLET EVERY DAY (TO REPLACE LISINOPRIL 40MG), Disp: , Rfl:   •  Multiple Vitamins-Minerals (MULTIVITAMIN ADULT EXTRA C PO), Take 1 tablet by mouth Daily., Disp: , Rfl:   •  Omega-3 Fatty Acids (EQL Omega 3 Fish Oil) 1000 MG capsule, Take 1 capsule by mouth Daily., Disp: , Rfl:    Past Medical History:   Diagnosis Date   • Arthritis    • Cancer (HCC)    • HTN (hypertension)    • PARVIN (obstructive sleep apnea)     CPAP compliance      Past Surgical History:   Procedure Laterality Date   • KNEE ARTHROPLASTY Right    • SHOULDER SURGERY Left     Fractured humerus   • TONSILLECTOMY        Family History   Problem Relation Age of Onset   • Cancer Mother    • Heart disease Father    • Heart attack Father    • No Known Problems Sister    • No Known Problems Brother       Social History     Socioeconomic History   • Marital status:    Tobacco Use   • Smoking status: Former     Packs/day: 1.00     Years: 15.00     Pack years: 15.00     Types: Cigarettes     Start  date: 1970     Quit date: 1985     Years since quittin.3   • Smokeless tobacco: Never   Vaping Use   • Vaping Use: Never used   Substance and Sexual Activity   • Alcohol use: Yes     Alcohol/week: 5.0 standard drinks     Types: 2 Glasses of wine, 2 Cans of beer, 1 Drinks containing 0.5 oz of alcohol per week     Comment: Small amount beer or whisky daily.   • Drug use: Never   • Sexual activity: Never     Review of Systems   Neurological: Positive for seizures.   All other systems reviewed and are negative.      Objective:    /64   Pulse 51   Wt 105 kg (231 lb)   SpO2 98%   BMI 31.33 kg/m²     Neurology Exam:    General apperance: NAD.     Mental status: Alert, awake and oriented to time place and person.    Recent and Remote memory: Intact.    Attention span and Concentration: Normal.     Language and Speech: Intact- No dysarthria.    Fluency, Naming , Repitition and Comprehension:  Intact    Cranial Nerves:   CN II: Visual fields are full. Intact. Fundi - Normal, No papillederma, Pupils - SUSIE  CN III, IV and VI: Extraocular movements are intact. Normal saccades.   CN V: Facial sensation is intact.   CN VII: Muscles of facial expression reveal no asymmetry. Intact.   CN VIII: Hearing is intact. Whispered voice intact.   CN IX and X: Palate elevates symmetrically. Intact  CN XI: Shoulder shrug is intact.   CN XII: Tongue is midline without evidence of atrophy or fasciculation.     Ophthalmoscopic exam of optic disc-normal    Motor:  Right UE muscle strength 5/5. Normal tone.     Left UE muscle strength 5/5. Normal tone.      Right LE muscle strength5/5. Normal tone.     Left LE muscle strength 5/5. Normal tone.      Sensory: Normal light touch, vibration and pinprick sensation bilaterally.    DTRs: 2+ bilaterally in upper and lower extremities.    Babinski: Negative bilaterally.    Co-ordination: Normal finger-to-nose, heel to shin B/L.    Rhomberg: Negative.    Gait: Normal.  Had difficulty  with tandem walking.    Cardiovascular: Regular rate and rhythm without murmur, gallop or rub.    Assessment and Plan:  1. Focal seizure (HCC)  Patient had 3 unprovoked seizures on 7/13 arising from the left posterior temporal region.  MRI brain was unremarkable  I have asked him to continue Keppra 500 mg twice a day.  He can start vitamin B6 supplements 100 mg daily to see if his side effects resolve.  If they do not he could be switched to a different antiepileptic  He will be following up with neurology at [facility] as all of his providers are there.  Counseled on seizure precautions.  Patient can now start driving as it has been over 3 months since his last seizure.  I have also counseled him to cut back on his alcohol intake.      Return if symptoms worsen or fail to improve.     I spent over 45 minutes with the patient face to face out of which over 50% (30 minutes) was spent in management, instructions and education.     Michelle Stark MD

## 2022-10-19 RX ORDER — LEVETIRACETAM 500 MG/1
500 TABLET ORAL 2 TIMES DAILY
Qty: 180 TABLET | Refills: 1 | OUTPATIENT
Start: 2022-10-19

## 2022-10-19 NOTE — TELEPHONE ENCOUNTER
Caller: Priyank Small    Relationship: Self    Best call back number :[phone]    Requested Prescriptions:   Requested Prescriptions     Pending Prescriptions Disp Refills   • levETIRAcetam (Keppra) 500 MG tablet 180 tablet 1     Sig: Take 1 tablet by mouth 2 (Two) Times a Day.        Pharmacy where request should be sent: [facility] - [facility] [address] - [phone]  - [phone]      Additional details provided by patient: MEDICATION WAS SENT TO THE WRONG PHARMACY ON 10-18-22, I HAVE UPDATE THE PHARMACY. PLEASE REVIEW AND ADVISE. - PT WAS ADVISED MY [facility] STATES IT WILL TAKE UP TO 14 DAYS TO RECEIVE THE MEDICATION. HE STATES WHAT SHOULD HE DO WHEN HE RUNS OUT OF THE 8 DAYS THAT WAS SENT TO LOCAL PHARMACY [facility]    Does the patient have less than a 3 day supply:  [] Yes  [] No    Ashely Maguire Rep   10/19/22 11:01 EDT            Itraconazole Counseling:  I discussed with the patient the risks of itraconazole including but not limited to liver damage, nausea/vomiting, neuropathy, and severe allergy.  The patient understands that this medication is best absorbed when taken with acidic beverages such as non-diet cola or ginger ale.  The patient understands that monitoring is required including baseline LFTs and repeat LFTs at intervals.  The patient understands that they are to contact us or the primary physician if concerning signs are noted.

## 2022-10-25 ENCOUNTER — TELEPHONE (OUTPATIENT)
Dept: PULMONOLOGY | Facility: CLINIC | Age: 72
End: 2022-10-25

## 2022-10-25 RX ORDER — LEVETIRACETAM 500 MG/1
TABLET ORAL
Qty: 16 TABLET | OUTPATIENT
Start: 2022-10-25

## 2022-10-25 NOTE — TELEPHONE ENCOUNTER
Fax refill request for Rx Levetiracetam 500 mg denied. Pt will need to contact PCP for further refills.

## 2022-10-26 RX ORDER — LEVETIRACETAM 500 MG/1
500 TABLET ORAL 2 TIMES DAILY
Qty: 180 TABLET | Refills: 1 | Status: SHIPPED | OUTPATIENT
Start: 2022-10-26

## 2022-10-26 NOTE — TELEPHONE ENCOUNTER
Rx Refill Note  Requested Prescriptions      No prescriptions requested or ordered in this encounter      Last filled:   Last office visit with prescribing clinician: 10/18/2022      Next office visit with prescribing clinician: Visit date not found     Carlota Dodge MA  10/26/22, 08:38 EDT